# Patient Record
Sex: MALE | Race: WHITE | NOT HISPANIC OR LATINO | URBAN - METROPOLITAN AREA
[De-identification: names, ages, dates, MRNs, and addresses within clinical notes are randomized per-mention and may not be internally consistent; named-entity substitution may affect disease eponyms.]

---

## 2018-05-23 ENCOUNTER — OUTPATIENT (OUTPATIENT)
Dept: OUTPATIENT SERVICES | Facility: HOSPITAL | Age: 38
LOS: 1 days | Discharge: HOME | End: 2018-05-23

## 2018-05-23 VITALS
OXYGEN SATURATION: 96 % | DIASTOLIC BLOOD PRESSURE: 87 MMHG | RESPIRATION RATE: 16 BRPM | HEART RATE: 79 BPM | SYSTOLIC BLOOD PRESSURE: 130 MMHG

## 2018-05-23 VITALS
HEART RATE: 75 BPM | WEIGHT: 220.02 LBS | DIASTOLIC BLOOD PRESSURE: 65 MMHG | OXYGEN SATURATION: 98 % | SYSTOLIC BLOOD PRESSURE: 140 MMHG | HEIGHT: 68 IN | RESPIRATION RATE: 18 BRPM | TEMPERATURE: 98 F

## 2018-05-23 RX ORDER — ONDANSETRON 8 MG/1
4 TABLET, FILM COATED ORAL ONCE
Qty: 0 | Refills: 0 | Status: DISCONTINUED | OUTPATIENT
Start: 2018-05-23 | End: 2018-06-07

## 2018-05-23 RX ORDER — IBUPROFEN 200 MG
1 TABLET ORAL
Qty: 20 | Refills: 0
Start: 2018-05-23

## 2018-05-23 RX ORDER — HYDROMORPHONE HYDROCHLORIDE 2 MG/ML
0.5 INJECTION INTRAMUSCULAR; INTRAVENOUS; SUBCUTANEOUS
Qty: 0 | Refills: 0 | Status: DISCONTINUED | OUTPATIENT
Start: 2018-05-23 | End: 2018-05-23

## 2018-05-23 RX ORDER — SODIUM CHLORIDE 9 MG/ML
1000 INJECTION, SOLUTION INTRAVENOUS
Qty: 0 | Refills: 0 | Status: DISCONTINUED | OUTPATIENT
Start: 2018-05-23 | End: 2018-06-07

## 2018-05-23 RX ORDER — ACETAMINOPHEN 500 MG
650 TABLET ORAL ONCE
Qty: 0 | Refills: 0 | Status: DISCONTINUED | OUTPATIENT
Start: 2018-05-23 | End: 2018-06-07

## 2018-05-23 RX ORDER — OXYCODONE AND ACETAMINOPHEN 5; 325 MG/1; MG/1
1 TABLET ORAL ONCE
Qty: 0 | Refills: 0 | Status: DISCONTINUED | OUTPATIENT
Start: 2018-05-23 | End: 2018-05-23

## 2018-05-23 RX ADMIN — SODIUM CHLORIDE 100 MILLILITER(S): 9 INJECTION, SOLUTION INTRAVENOUS at 11:05

## 2018-05-23 NOTE — CHART NOTE - NSCHARTNOTEFT_GEN_A_CORE
PACU ANESTHESIA ADMISSION NOTE      Procedure: Open release of right carpal tunnel    Post op diagnosis:  Carpal tunnel syndrome of right wrist      ____  Intubated  TV:______       Rate: ______      FiO2: ______    _x___  Patent Airway    _x___  Full return of protective reflexes    _x___  Full recovery from anesthesia / back to baseline status    Vitals:            T:   98.5             BP :  131/83              R:    16          Sat:    100           P:86      Mental Status:  _x___ Awake   _____ Alert   _____ Drowsy   _____ Sedated    Nausea/Vomiting:  _x___  NO       ______Yes,   See Post - Op Orders         Pain Scale (0-10):  __0___    Treatment: _x___ None    ____ See Post - Op/PCA Orders    Post - Operative Fluids:   __x__ Oral   ____ See Post - Op Orders    Plan: Discharge:   _x___Home       _____Floor     _____Critical Care    _____  Other:_________________    Comments:  No anesthesia issues or complications noted.  Discharge when criteria met.

## 2018-05-23 NOTE — BRIEF OPERATIVE NOTE - PROCEDURE
<<-----Click on this checkbox to enter Procedure Open release of right carpal tunnel  05/23/2018    Active  TOMMIE

## 2018-05-23 NOTE — ASU DISCHARGE PLAN (ADULT/PEDIATRIC). - SPECIAL INSTRUCTIONS

## 2018-05-28 DIAGNOSIS — F17.200 NICOTINE DEPENDENCE, UNSPECIFIED, UNCOMPLICATED: ICD-10-CM

## 2018-05-28 DIAGNOSIS — G56.01 CARPAL TUNNEL SYNDROME, RIGHT UPPER LIMB: ICD-10-CM

## 2020-01-19 ENCOUNTER — EMERGENCY (EMERGENCY)
Facility: HOSPITAL | Age: 40
LOS: 0 days | Discharge: HOME | End: 2020-01-19
Attending: EMERGENCY MEDICINE | Admitting: EMERGENCY MEDICINE
Payer: COMMERCIAL

## 2020-01-19 VITALS
HEIGHT: 68 IN | HEART RATE: 96 BPM | DIASTOLIC BLOOD PRESSURE: 84 MMHG | WEIGHT: 237 LBS | RESPIRATION RATE: 18 BRPM | TEMPERATURE: 97 F | OXYGEN SATURATION: 96 % | SYSTOLIC BLOOD PRESSURE: 170 MMHG

## 2020-01-19 VITALS
SYSTOLIC BLOOD PRESSURE: 155 MMHG | OXYGEN SATURATION: 99 % | RESPIRATION RATE: 18 BRPM | HEART RATE: 90 BPM | DIASTOLIC BLOOD PRESSURE: 85 MMHG | TEMPERATURE: 98 F
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
## 2020-01-19 DIAGNOSIS — T78.40XA ALLERGY, UNSPECIFIED, INITIAL ENCOUNTER: ICD-10-CM

## 2020-01-19 PROCEDURE — 99284 EMERGENCY DEPT VISIT MOD MDM: CPT

## 2020-01-19 RX ORDER — SODIUM CHLORIDE 9 MG/ML
1000 INJECTION INTRAMUSCULAR; INTRAVENOUS; SUBCUTANEOUS ONCE
Refills: 0 | Status: COMPLETED | OUTPATIENT
Start: 2020-01-19 | End: 2020-01-19

## 2020-01-19 RX ORDER — DEXAMETHASONE 0.5 MG/5ML
10 ELIXIR ORAL ONCE
Refills: 0 | Status: COMPLETED | OUTPATIENT
Start: 2020-01-19 | End: 2020-01-19

## 2020-01-19 RX ORDER — FAMOTIDINE 10 MG/ML
20 INJECTION INTRAVENOUS ONCE
Refills: 0 | Status: COMPLETED | OUTPATIENT
Start: 2020-01-19 | End: 2020-01-19

## 2020-01-19 RX ORDER — DIPHENHYDRAMINE HCL 50 MG
50 CAPSULE ORAL ONCE
Refills: 0 | Status: COMPLETED | OUTPATIENT
Start: 2020-01-19 | End: 2020-01-19

## 2020-01-19 RX ADMIN — Medication 50 MILLIGRAM(S): at 20:22

## 2020-01-19 RX ADMIN — FAMOTIDINE 100 MILLIGRAM(S): 10 INJECTION INTRAVENOUS at 20:26

## 2020-01-19 RX ADMIN — SODIUM CHLORIDE 1000 MILLILITER(S): 9 INJECTION INTRAMUSCULAR; INTRAVENOUS; SUBCUTANEOUS at 20:20

## 2020-01-19 RX ADMIN — Medication 10 MILLIGRAM(S): at 20:23

## 2020-01-19 NOTE — ED PROVIDER NOTE - PHYSICAL EXAMINATION
CONST: Well appearing in NAD  EYES: Sclera and conjunctiva clear.   ENT: No nasal discharge. Oropharynx normal appearing, no erythema or exudates. No abscess or swelling. Uvula midline.   NECK: Non-tender, no meningeal signs. normal ROM. supple   CARD: S1 S2; No mrg  RESP: Equal BS B/L, No wheezes, rhonchi or rales. No distress  GI: Soft, non-tender, non-distended. normal BS  MS: Normal ROM in all extremities. pulses 2 +.   SKIN: Warm, dry, no acute rashes. Good turgor  NEURO: A&Ox3, No focal deficits. Strength 5/5 with no sensory deficits. Steady gait.

## 2020-01-19 NOTE — ED PROVIDER NOTE - NS ED ROS FT
Constitutional: (-) fever  Eyes/ENT: (+) lip swelling, (-) blurry vision, (-) epistaxis  Cardiovascular: (-) chest pain, (-) syncope  Respiratory: (-) cough, (-) shortness of breath  Gastrointestinal: (-) vomiting, (-) diarrhea  : (-) dysuria, (-) hematuria  Musculoskeletal: (-) neck pain, (-) back pain, (-) joint pain  Integumentary: (-) rash, (-) edema  Neurological: (-) headache, (-) altered mental status  Allergic/Immunologic: (+) pruritus

## 2020-01-19 NOTE — ED PROVIDER NOTE - PATIENT PORTAL LINK FT
You can access the FollowMyHealth Patient Portal offered by [facility] by registering at the following website: [email or web address]. By joining [employer]’s FollowMyHealth portal, you will also be able to view your health information using other applications (apps) compatible with our system.

## 2020-01-19 NOTE — ED PROVIDER NOTE - CARE PROVIDER_API CALL
Yuki Ba)  Allergy and Immunology; Internal Medicine  [address] [place] [address]  Phone: [phone]  Fax: [phone]  Follow Up Time:

## 2020-01-19 NOTE — ED PROVIDER NOTE - OBJECTIVE STATEMENT
39 M presents for eval of allergic rx. Pt states he woke from a nap x1hr ago with lip swelling and pruritis on his trunk, no aggravating or releiving factors. Denies rash, sob, difficulty swallowing, fever, numbness, weakness, n/v/d/c, wheezing

## 2020-01-19 NOTE — ED PROVIDER NOTE - ATTENDING CONTRIBUTION TO CARE
I personally evaluated the patient. I reviewed the Resident’s or Physician Assistant’s note (as assigned above), and agree with the findings and plan except as documented in my note.  Chart reviewed. Presents with swollen lips and itchiness. Exam shows alert patient, speaking in full sentences, in no distress, HEENT mildly swollen lips, no angioedema, lungs clear, no rash.

## 2020-02-09 ENCOUNTER — INPATIENT (INPATIENT)
Facility: HOSPITAL | Age: 40
LOS: 5 days | Discharge: HOME | End: 2020-02-15
Attending: INTERNAL MEDICINE | Admitting: INTERNAL MEDICINE
Payer: COMMERCIAL

## 2020-02-09 VITALS
DIASTOLIC BLOOD PRESSURE: 96 MMHG | OXYGEN SATURATION: 95 % | SYSTOLIC BLOOD PRESSURE: 146 MMHG | WEIGHT: 190.04 LBS | RESPIRATION RATE: 20 BRPM | HEART RATE: 151 BPM

## 2020-02-09 LAB
ALBUMIN SERPL ELPH-MCNC: 5 G/DL — SIGNIFICANT CHANGE UP (ref 3.5–5.2)
ALP SERPL-CCNC: 78 U/L — SIGNIFICANT CHANGE UP (ref 30–115)
ALT FLD-CCNC: 126 U/L — HIGH (ref 0–41)
ANION GAP SERPL CALC-SCNC: 23 MMOL/L — HIGH (ref 7–14)
AST SERPL-CCNC: 166 U/L — HIGH (ref 0–41)
BILIRUB SERPL-MCNC: 0.3 MG/DL — SIGNIFICANT CHANGE UP (ref 0.2–1.2)
BUN SERPL-MCNC: 34 MG/DL — HIGH (ref 10–20)
CALCIUM SERPL-MCNC: 9.2 MG/DL — SIGNIFICANT CHANGE UP (ref 8.5–10.1)
CHLORIDE SERPL-SCNC: 100 MMOL/L — SIGNIFICANT CHANGE UP (ref 98–110)
CO2 SERPL-SCNC: 17 MMOL/L — SIGNIFICANT CHANGE UP (ref 17–32)
CREAT SERPL-MCNC: 3 MG/DL — HIGH (ref 0.7–1.5)
GAS PNL BLDV: SIGNIFICANT CHANGE UP
GLUCOSE SERPL-MCNC: 234 MG/DL — HIGH (ref 70–99)
HCT VFR BLD CALC: 46.6 % — SIGNIFICANT CHANGE UP (ref 42–52)
HGB BLD-MCNC: 14.9 G/DL — SIGNIFICANT CHANGE UP (ref 14–18)
MCHC RBC-ENTMCNC: 30.9 PG — SIGNIFICANT CHANGE UP (ref 27–31)
MCHC RBC-ENTMCNC: 32 G/DL — SIGNIFICANT CHANGE UP (ref 32–37)
MCV RBC AUTO: 96.7 FL — HIGH (ref 80–94)
PLATELET # BLD AUTO: 241 K/UL — SIGNIFICANT CHANGE UP (ref 130–400)
POTASSIUM SERPL-MCNC: 5.4 MMOL/L — HIGH (ref 3.5–5)
POTASSIUM SERPL-SCNC: 5.4 MMOL/L — HIGH (ref 3.5–5)
PROT SERPL-MCNC: 7.6 G/DL — SIGNIFICANT CHANGE UP (ref 6–8)
RBC # BLD: 4.82 M/UL — SIGNIFICANT CHANGE UP (ref 4.7–6.1)
RBC # FLD: 12.8 % — SIGNIFICANT CHANGE UP (ref 11.5–14.5)
SODIUM SERPL-SCNC: 140 MMOL/L — SIGNIFICANT CHANGE UP (ref 135–146)
WBC # BLD: 25.74 K/UL — HIGH (ref 4.8–10.8)
WBC # FLD AUTO: 25.74 K/UL — HIGH (ref 4.8–10.8)

## 2020-02-09 PROCEDURE — 93010 ELECTROCARDIOGRAM REPORT: CPT

## 2020-02-09 PROCEDURE — 36556 INSERT NON-TUNNEL CV CATH: CPT

## 2020-02-09 PROCEDURE — 76937 US GUIDE VASCULAR ACCESS: CPT | Mod: 26

## 2020-02-09 PROCEDURE — 99291 CRITICAL CARE FIRST HOUR: CPT | Mod: 25

## 2020-02-09 RX ORDER — SODIUM CHLORIDE 9 MG/ML
2000 INJECTION INTRAMUSCULAR; INTRAVENOUS; SUBCUTANEOUS ONCE
Refills: 0 | Status: DISCONTINUED | OUTPATIENT
Start: 2020-02-09 | End: 2020-02-10

## 2020-02-09 NOTE — ED PROVIDER NOTE - OBJECTIVE STATEMENT
39yM with PMH opiate abuse nkda p/w suspected overdose. pt states he did some heroin today. per ems report he was found in his car after being in there for unknown length of time (suspected 4-6 hours). received 6mg narcan via ems with improvement in mental status. became tachycardic en route to hospital. pt with no complaints at this time. no nausea diaphoresis body aches or pain. no ht per his report.

## 2020-02-09 NOTE — ED PROVIDER NOTE - PHYSICAL EXAMINATION
Vital Signs: I have reviewed the initial vital signs.  Constitutional: NAD, well-nourished, appears stated age, no acute distress.  HEENT: Airway patent, moist MM, no erythema/swelling/deformity of oral structures. EOMI, PERRLA.  CV: tachycardic rate, irregular rhythm, well-perfused extremities, 2+ b/l DP and radial pulses equal.  Lungs: BCTA, no increased WOB.  ABD: NTND, no guarding or rebound, no pulsatile mass, no hernias.   MSK: Neck supple, nontender, nl ROM, no stepoff. Chest nontender. Back nontender in TLS spine or to b/l bony structures or flanks. Ext nontender, nl rom, no deformity.   INTEG: Skin warm, dry, no rash.  NEURO: A&Ox3, moving all extremities, normal speech  PSYCH: Calm, cooperative, normal affect and interaction.

## 2020-02-09 NOTE — ED PROVIDER NOTE - CLINICAL SUMMARY MEDICAL DECISION MAKING FREE TEXT BOX
I personally evaluated the patient. I reviewed the Resident’s or Physician Assistant’s note (as assigned above), and agree with the findings and plan except as documented in my note. patient evaluated for rhabdo, maynor, post narcan, multiple repeat pulmonary exams unremarkable for ards and pulmonary edema. Patient endorsed to icu, central line being laced, patient back to sinus rythms ( sinus tachycardia) while on bipap

## 2020-02-09 NOTE — ED PROVIDER NOTE - NS ED ROS FT
Eyes:  No visual changes, eye pain or discharge.  ENMT:  No hearing changes, pain, no sore throat or runny nose, no difficulty swallowing  Cardiac:  No chest pain, SOB or edema. No chest pain with exertion.  Respiratory:  No cough or respiratory distress.    GI:  No nausea, vomiting, diarrhea or abdominal pain.  :  No dysuria, frequency or burning.  MS:  No myalgia, muscle weakness, joint pain or back pain.  Neuro:  No headache or weakness.  No LOC. +AMS  Skin:  No skin rash.   Endocrine: No history of thyroid disease or diabetes.

## 2020-02-09 NOTE — ED ADULT TRIAGE NOTE - CHIEF COMPLAINT QUOTE
BIBA after being found in car by coworker  as per EMS pt overdose on unknown opiate & was in car for over 6 hours  pt gave 2 round narcan IV in the field which pt responded to  unable to obtain temp in triage

## 2020-02-09 NOTE — ED PROVIDER NOTE - ATTENDING CONTRIBUTION TO CARE
39 year old male, pmhx of heroin abuse, come sin s/p narcan administration, patient was found overdosed in his car for 4-6 hours, given narcan and came to, patient endorses heroin use for chronic back pain, no cp/sob, no loc, no fever. here found to be in afib rvr, elevated ck and rhabdo, no b line son bilateral lung US.     CONSTITUTIONAL: Well-developed; well-nourished; in no acute distress. Sitting up and providing appropriate history and physical examination  SKIN: skin exam is warm and dry, no acute rash.  HEAD: Normocephalic; atraumatic.  EYES: PERRL, 3 mm bilateral, no nystagmus, EOM intact; conjunctiva and sclera clear.  ENT: + DRY MM, No nasal discharge; airway clear.  NECK: Supple; non tender. + full passive ROM in all directions. No JVD  CARD: S1, S2 normal; no murmurs, gallops, or rubs. Regular rate and rhythm. + Symmetric Strong Pulses  RESP: No wheezes, rales or rhonchi. Good air movement bilaterally  ABD: soft; non-distended; non-tender. No Rebound, No Guarding, No signs of peritonitis, No CVA tenderness. No pulsatile abdominal mass. + Strong and Symmetric Pulses  EXT: Normal ROM. No clubbing, cyanosis or edema. Dp and Pt Pulses intact. Cap refill less than 3 seconds  NEURO: Alert, oriented, grossly unremarkable. No Focal deficits. GCS 15. NIH 0  PSYCH: Cooperative, appropriate.

## 2020-02-09 NOTE — ED PROVIDER NOTE - CARE PLAN
Principal Discharge DX:	Opiate overdose  Secondary Diagnosis:	BENNY (acute kidney injury)  Secondary Diagnosis:	Rhabdomyolysis  Secondary Diagnosis:	Respiratory acidosis

## 2020-02-10 LAB
ALBUMIN SERPL ELPH-MCNC: 4.1 G/DL — SIGNIFICANT CHANGE UP (ref 3.5–5.2)
ALBUMIN SERPL ELPH-MCNC: 4.1 G/DL — SIGNIFICANT CHANGE UP (ref 3.5–5.2)
ALP SERPL-CCNC: 52 U/L — SIGNIFICANT CHANGE UP (ref 30–115)
ALP SERPL-CCNC: 55 U/L — SIGNIFICANT CHANGE UP (ref 30–115)
ALT FLD-CCNC: 352 U/L — HIGH (ref 0–41)
ALT FLD-CCNC: 409 U/L — HIGH (ref 0–41)
ANION GAP SERPL CALC-SCNC: 13 MMOL/L — SIGNIFICANT CHANGE UP (ref 7–14)
ANION GAP SERPL CALC-SCNC: 14 MMOL/L — SIGNIFICANT CHANGE UP (ref 7–14)
ANION GAP SERPL CALC-SCNC: 15 MMOL/L — HIGH (ref 7–14)
APPEARANCE UR: ABNORMAL
AST SERPL-CCNC: 535 U/L — HIGH (ref 0–41)
AST SERPL-CCNC: 629 U/L — HIGH (ref 0–41)
BACTERIA # UR AUTO: 0 — SIGNIFICANT CHANGE UP
BASE EXCESS BLDV CALC-SCNC: -8.3 MMOL/L — LOW (ref -2–2)
BASOPHILS # BLD AUTO: 0.02 K/UL — SIGNIFICANT CHANGE UP (ref 0–0.2)
BASOPHILS # BLD AUTO: 0.07 K/UL — SIGNIFICANT CHANGE UP (ref 0–0.2)
BASOPHILS NFR BLD AUTO: 0.1 % — SIGNIFICANT CHANGE UP (ref 0–1)
BASOPHILS NFR BLD AUTO: 0.3 % — SIGNIFICANT CHANGE UP (ref 0–1)
BILIRUB DIRECT SERPL-MCNC: 0.2 MG/DL — SIGNIFICANT CHANGE UP (ref 0–0.2)
BILIRUB INDIRECT FLD-MCNC: 0.3 MG/DL — SIGNIFICANT CHANGE UP (ref 0.2–1.2)
BILIRUB SERPL-MCNC: 0.5 MG/DL — SIGNIFICANT CHANGE UP (ref 0.2–1.2)
BILIRUB SERPL-MCNC: 0.5 MG/DL — SIGNIFICANT CHANGE UP (ref 0.2–1.2)
BILIRUB UR-MCNC: NEGATIVE — SIGNIFICANT CHANGE UP
BUN SERPL-MCNC: 24 MG/DL — HIGH (ref 10–20)
BUN SERPL-MCNC: 26 MG/DL — HIGH (ref 10–20)
BUN SERPL-MCNC: 31 MG/DL — HIGH (ref 10–20)
CA-I SERPL-SCNC: 1.12 MMOL/L — SIGNIFICANT CHANGE UP (ref 1.12–1.3)
CALCIUM SERPL-MCNC: 7.7 MG/DL — LOW (ref 8.5–10.1)
CALCIUM SERPL-MCNC: 8.4 MG/DL — LOW (ref 8.5–10.1)
CALCIUM SERPL-MCNC: 8.6 MG/DL — SIGNIFICANT CHANGE UP (ref 8.5–10.1)
CHLORIDE SERPL-SCNC: 109 MMOL/L — SIGNIFICANT CHANGE UP (ref 98–110)
CHLORIDE SERPL-SCNC: 109 MMOL/L — SIGNIFICANT CHANGE UP (ref 98–110)
CHLORIDE SERPL-SCNC: 111 MMOL/L — HIGH (ref 98–110)
CK SERPL-CCNC: 8936 U/L — HIGH (ref 0–225)
CK SERPL-CCNC: HIGH U/L (ref 0–225)
CO2 SERPL-SCNC: 17 MMOL/L — SIGNIFICANT CHANGE UP (ref 17–32)
CO2 SERPL-SCNC: 17 MMOL/L — SIGNIFICANT CHANGE UP (ref 17–32)
CO2 SERPL-SCNC: 20 MMOL/L — SIGNIFICANT CHANGE UP (ref 17–32)
COLOR SPEC: YELLOW — SIGNIFICANT CHANGE UP
CREAT SERPL-MCNC: 1.4 MG/DL — SIGNIFICANT CHANGE UP (ref 0.7–1.5)
CREAT SERPL-MCNC: 1.4 MG/DL — SIGNIFICANT CHANGE UP (ref 0.7–1.5)
CREAT SERPL-MCNC: 2.1 MG/DL — HIGH (ref 0.7–1.5)
DIFF PNL FLD: ABNORMAL
EOSINOPHIL # BLD AUTO: 0 K/UL — SIGNIFICANT CHANGE UP (ref 0–0.7)
EOSINOPHIL # BLD AUTO: 0 K/UL — SIGNIFICANT CHANGE UP (ref 0–0.7)
EOSINOPHIL NFR BLD AUTO: 0 % — SIGNIFICANT CHANGE UP (ref 0–8)
EOSINOPHIL NFR BLD AUTO: 0 % — SIGNIFICANT CHANGE UP (ref 0–8)
EPI CELLS # UR: 7 /HPF — HIGH (ref 0–5)
GAS PNL BLDA: SIGNIFICANT CHANGE UP
GAS PNL BLDV: 144 MMOL/L — SIGNIFICANT CHANGE UP (ref 136–145)
GLUCOSE SERPL-MCNC: 110 MG/DL — HIGH (ref 70–99)
GLUCOSE SERPL-MCNC: 77 MG/DL — SIGNIFICANT CHANGE UP (ref 70–99)
GLUCOSE SERPL-MCNC: 86 MG/DL — SIGNIFICANT CHANGE UP (ref 70–99)
GLUCOSE UR QL: ABNORMAL
HCO3 BLDV-SCNC: 22 MMOL/L — SIGNIFICANT CHANGE UP (ref 22–29)
HCT VFR BLD CALC: 38.4 % — LOW (ref 42–52)
HCT VFR BLDA CALC: 44 % — SIGNIFICANT CHANGE UP (ref 34–44)
HGB BLD CALC-MCNC: 14.4 G/DL — SIGNIFICANT CHANGE UP (ref 14–18)
HGB BLD-MCNC: 12.6 G/DL — LOW (ref 14–18)
HYALINE CASTS # UR AUTO: >145 /LPF — HIGH (ref 0–7)
IMM GRANULOCYTES NFR BLD AUTO: 0.5 % — HIGH (ref 0.1–0.3)
IMM GRANULOCYTES NFR BLD AUTO: 1 % — HIGH (ref 0.1–0.3)
KETONES UR-MCNC: NEGATIVE — SIGNIFICANT CHANGE UP
LACTATE BLDV-MCNC: 5.6 MMOL/L — HIGH (ref 0.5–1.6)
LEUKOCYTE ESTERASE UR-ACNC: NEGATIVE — SIGNIFICANT CHANGE UP
LYMPHOCYTES # BLD AUTO: 1.22 K/UL — SIGNIFICANT CHANGE UP (ref 1.2–3.4)
LYMPHOCYTES # BLD AUTO: 1.56 K/UL — SIGNIFICANT CHANGE UP (ref 1.2–3.4)
LYMPHOCYTES # BLD AUTO: 4.7 % — LOW (ref 20.5–51.1)
LYMPHOCYTES # BLD AUTO: 6.7 % — LOW (ref 20.5–51.1)
MAGNESIUM SERPL-MCNC: 2.1 MG/DL — SIGNIFICANT CHANGE UP (ref 1.8–2.4)
MAGNESIUM SERPL-MCNC: 2.1 MG/DL — SIGNIFICANT CHANGE UP (ref 1.8–2.4)
MCHC RBC-ENTMCNC: 30.9 PG — SIGNIFICANT CHANGE UP (ref 27–31)
MCHC RBC-ENTMCNC: 32.8 G/DL — SIGNIFICANT CHANGE UP (ref 32–37)
MCV RBC AUTO: 94.1 FL — HIGH (ref 80–94)
MONOCYTES # BLD AUTO: 0.74 K/UL — HIGH (ref 0.1–0.6)
MONOCYTES # BLD AUTO: 1.35 K/UL — HIGH (ref 0.1–0.6)
MONOCYTES NFR BLD AUTO: 2.9 % — SIGNIFICANT CHANGE UP (ref 1.7–9.3)
MONOCYTES NFR BLD AUTO: 5.8 % — SIGNIFICANT CHANGE UP (ref 1.7–9.3)
NEUTROPHILS # BLD AUTO: 20.27 K/UL — HIGH (ref 1.4–6.5)
NEUTROPHILS # BLD AUTO: 23.46 K/UL — HIGH (ref 1.4–6.5)
NEUTROPHILS NFR BLD AUTO: 86.9 % — HIGH (ref 42.2–75.2)
NEUTROPHILS NFR BLD AUTO: 91.1 % — HIGH (ref 42.2–75.2)
NITRITE UR-MCNC: NEGATIVE — SIGNIFICANT CHANGE UP
NRBC # BLD: 0 /100 WBCS — SIGNIFICANT CHANGE UP (ref 0–0)
NRBC # BLD: 0 /100 WBCS — SIGNIFICANT CHANGE UP (ref 0–0)
PCO2 BLDV: 62 MMHG — HIGH (ref 41–51)
PH BLDV: 7.15 — LOW (ref 7.26–7.43)
PH UR: 6 — SIGNIFICANT CHANGE UP (ref 5–8)
PLATELET # BLD AUTO: 170 K/UL — SIGNIFICANT CHANGE UP (ref 130–400)
PO2 BLDV: 17 MMHG — LOW (ref 20–40)
POTASSIUM BLDV-SCNC: 4.8 MMOL/L — SIGNIFICANT CHANGE UP (ref 3.3–5.6)
POTASSIUM SERPL-MCNC: 4.5 MMOL/L — SIGNIFICANT CHANGE UP (ref 3.5–5)
POTASSIUM SERPL-MCNC: 4.6 MMOL/L — SIGNIFICANT CHANGE UP (ref 3.5–5)
POTASSIUM SERPL-MCNC: 5.2 MMOL/L — HIGH (ref 3.5–5)
POTASSIUM SERPL-SCNC: 4.5 MMOL/L — SIGNIFICANT CHANGE UP (ref 3.5–5)
POTASSIUM SERPL-SCNC: 4.6 MMOL/L — SIGNIFICANT CHANGE UP (ref 3.5–5)
POTASSIUM SERPL-SCNC: 5.2 MMOL/L — HIGH (ref 3.5–5)
PROT SERPL-MCNC: 6.1 G/DL — SIGNIFICANT CHANGE UP (ref 6–8)
PROT SERPL-MCNC: 6.5 G/DL — SIGNIFICANT CHANGE UP (ref 6–8)
PROT UR-MCNC: ABNORMAL
RBC # BLD: 4.08 M/UL — LOW (ref 4.7–6.1)
RBC # FLD: 12.9 % — SIGNIFICANT CHANGE UP (ref 11.5–14.5)
RBC CASTS # UR COMP ASSIST: 32 /HPF — HIGH (ref 0–4)
SAO2 % BLDV: 20 % — SIGNIFICANT CHANGE UP
SODIUM SERPL-SCNC: 141 MMOL/L — SIGNIFICANT CHANGE UP (ref 135–146)
SODIUM SERPL-SCNC: 142 MMOL/L — SIGNIFICANT CHANGE UP (ref 135–146)
SODIUM SERPL-SCNC: 142 MMOL/L — SIGNIFICANT CHANGE UP (ref 135–146)
SP GR SPEC: 1.02 — SIGNIFICANT CHANGE UP (ref 1.01–1.02)
T4 AB SER-ACNC: 6.6 UG/DL — SIGNIFICANT CHANGE UP (ref 4.6–12)
TROPONIN T SERPL-MCNC: <0.01 NG/ML — SIGNIFICANT CHANGE UP
TROPONIN T SERPL-MCNC: <0.01 NG/ML — SIGNIFICANT CHANGE UP
TSH SERPL-MCNC: 0.98 UIU/ML — SIGNIFICANT CHANGE UP (ref 0.27–4.2)
UROBILINOGEN FLD QL: SIGNIFICANT CHANGE UP
WBC # BLD: 23.31 K/UL — HIGH (ref 4.8–10.8)
WBC # FLD AUTO: 23.31 K/UL — HIGH (ref 4.8–10.8)
WBC UR QL: 4 /HPF — SIGNIFICANT CHANGE UP (ref 0–5)

## 2020-02-10 PROCEDURE — 71045 X-RAY EXAM CHEST 1 VIEW: CPT | Mod: 26,77

## 2020-02-10 PROCEDURE — 71045 X-RAY EXAM CHEST 1 VIEW: CPT | Mod: 26

## 2020-02-10 PROCEDURE — 93306 TTE W/DOPPLER COMPLETE: CPT | Mod: 26

## 2020-02-10 PROCEDURE — 76770 US EXAM ABDO BACK WALL COMP: CPT | Mod: 26

## 2020-02-10 PROCEDURE — 93010 ELECTROCARDIOGRAM REPORT: CPT

## 2020-02-10 RX ORDER — SODIUM CHLORIDE 9 MG/ML
2000 INJECTION INTRAMUSCULAR; INTRAVENOUS; SUBCUTANEOUS ONCE
Refills: 0 | Status: COMPLETED | OUTPATIENT
Start: 2020-02-10 | End: 2020-02-10

## 2020-02-10 RX ORDER — SODIUM CHLORIDE 9 MG/ML
1000 INJECTION INTRAMUSCULAR; INTRAVENOUS; SUBCUTANEOUS
Refills: 0 | Status: DISCONTINUED | OUTPATIENT
Start: 2020-02-10 | End: 2020-02-10

## 2020-02-10 RX ORDER — AMPICILLIN SODIUM AND SULBACTAM SODIUM 250; 125 MG/ML; MG/ML
1.5 INJECTION, POWDER, FOR SUSPENSION INTRAMUSCULAR; INTRAVENOUS ONCE
Refills: 0 | Status: COMPLETED | OUTPATIENT
Start: 2020-02-10 | End: 2020-02-10

## 2020-02-10 RX ORDER — AMPICILLIN SODIUM AND SULBACTAM SODIUM 250; 125 MG/ML; MG/ML
1.5 INJECTION, POWDER, FOR SUSPENSION INTRAMUSCULAR; INTRAVENOUS EVERY 6 HOURS
Refills: 0 | Status: DISCONTINUED | OUTPATIENT
Start: 2020-02-10 | End: 2020-02-15

## 2020-02-10 RX ORDER — CHLORHEXIDINE GLUCONATE 213 G/1000ML
1 SOLUTION TOPICAL
Refills: 0 | Status: DISCONTINUED | OUTPATIENT
Start: 2020-02-10 | End: 2020-02-15

## 2020-02-10 RX ORDER — HEPARIN SODIUM 5000 [USP'U]/ML
5000 INJECTION INTRAVENOUS; SUBCUTANEOUS EVERY 8 HOURS
Refills: 0 | Status: DISCONTINUED | OUTPATIENT
Start: 2020-02-10 | End: 2020-02-12

## 2020-02-10 RX ORDER — SODIUM CHLORIDE 9 MG/ML
1000 INJECTION INTRAMUSCULAR; INTRAVENOUS; SUBCUTANEOUS
Refills: 0 | Status: DISCONTINUED | OUTPATIENT
Start: 2020-02-10 | End: 2020-02-11

## 2020-02-10 RX ORDER — OXYCODONE HYDROCHLORIDE 5 MG/1
0 TABLET ORAL
Qty: 0 | Refills: 0 | DISCHARGE

## 2020-02-10 RX ORDER — FAMOTIDINE 10 MG/ML
20 INJECTION INTRAVENOUS ONCE
Refills: 0 | Status: COMPLETED | OUTPATIENT
Start: 2020-02-10 | End: 2020-02-10

## 2020-02-10 RX ADMIN — AMPICILLIN SODIUM AND SULBACTAM SODIUM 100 GRAM(S): 250; 125 INJECTION, POWDER, FOR SUSPENSION INTRAMUSCULAR; INTRAVENOUS at 18:26

## 2020-02-10 RX ADMIN — SODIUM CHLORIDE 4000 MILLILITER(S): 9 INJECTION INTRAMUSCULAR; INTRAVENOUS; SUBCUTANEOUS at 01:12

## 2020-02-10 RX ADMIN — HEPARIN SODIUM 5000 UNIT(S): 5000 INJECTION INTRAVENOUS; SUBCUTANEOUS at 13:15

## 2020-02-10 RX ADMIN — SODIUM CHLORIDE 75 MILLILITER(S): 9 INJECTION INTRAMUSCULAR; INTRAVENOUS; SUBCUTANEOUS at 13:15

## 2020-02-10 RX ADMIN — FAMOTIDINE 20 MILLIGRAM(S): 10 INJECTION INTRAVENOUS at 01:18

## 2020-02-10 RX ADMIN — CHLORHEXIDINE GLUCONATE 1 APPLICATION(S): 213 SOLUTION TOPICAL at 07:17

## 2020-02-10 RX ADMIN — AMPICILLIN SODIUM AND SULBACTAM SODIUM 200 GRAM(S): 250; 125 INJECTION, POWDER, FOR SUSPENSION INTRAMUSCULAR; INTRAVENOUS at 01:32

## 2020-02-10 RX ADMIN — HEPARIN SODIUM 5000 UNIT(S): 5000 INJECTION INTRAVENOUS; SUBCUTANEOUS at 07:18

## 2020-02-10 RX ADMIN — SODIUM CHLORIDE 100 MILLILITER(S): 9 INJECTION INTRAMUSCULAR; INTRAVENOUS; SUBCUTANEOUS at 12:33

## 2020-02-10 RX ADMIN — SODIUM CHLORIDE 100 MILLILITER(S): 9 INJECTION INTRAMUSCULAR; INTRAVENOUS; SUBCUTANEOUS at 16:56

## 2020-02-10 RX ADMIN — HEPARIN SODIUM 5000 UNIT(S): 5000 INJECTION INTRAVENOUS; SUBCUTANEOUS at 21:55

## 2020-02-10 NOTE — H&P ADULT - HISTORY OF PRESENT ILLNESS
39 year old man with PMHx opiod abuse presenting after being found in his car unresponsive. He snorts heroin daily. He was given narcan 6mg by EMS upon arrival. He then became tachycardia to the 150s and was found to be in Afib with RVR. Denied fever, chills, n/v/d/c, cp,  pleuritic cp, palpitations, cough, wheezing, hemoptysis, HA, blurry vision, dizziness, lightheadedness, numbness, tingling, neck pain, neck stiffness, abdominal pain, melena, BRBPR, hematuria, urinary incontinace, trauma, calf pain/swelling/erythema, sick contacts, recent travel or rash.    In ED, he was found to be in BENNY with a Cr of 3. His CK was in the thousands consistent with rhabdomyolysis. He was also found to have respiratory acidosis on ABG with pH 7,1 and pCO2 62. He was given 1L NS and placed on BiPaP with improvement. CXR showed b/l infiltrates. WBC was 25. 39 year old man with PMHx opiod abuse ( was in patient detox, last 4 weeks op detox), presenting after being found in his car unresponsive was in car for 4 to 6 h. He snorts heroin daily. He was given narcan 6mg by EMS upon arrival. He then became tachycardia to the 150s and was found to be in Afib with RVR. Denied fever, chills, n/v/d/c, cp,  pleuritic cp, palpitations, cough, wheezing, hemoptysis, HA, blurry vision, dizziness, lightheadedness, numbness, tingling, neck pain, neck stiffness, abdominal pain, melena, BRBPR, hematuria, urinary incontinace, trauma, calf pain/swelling/erythema, sick contacts, recent travel or rash.    In ED, he was found to be in BENNY with a Cr of 3. High CK. He was also found to have respiratory acidosis on ABG with pH 7,1 and pCO2 62. He was given 1L NS and placed on BiPaP with improvement. CXR showed b/l infiltrates. WBC was 25. called for MICU. This AM more awake c/o mainly of back pain.

## 2020-02-10 NOTE — PATIENT PROFILE ADULT - FUNCTIONAL SCREEN CURRENT LEVEL: COMMUNICATION, MLM
Orders for admission, patient is aware of plan and ready to go upstairs. Any questions, please call ED RN costa at extension [phone]. Pt is alert and oriented. Family at bedside. Pt is ambulatory with standby assist.  Pt has history of multiple falls. 0 = understands/communicates without difficulty

## 2020-02-10 NOTE — H&P ADULT - ATTENDING COMMENTS
patient seen and examined, agree with above, acute hypercapnic resp failure/ opiates overdose/ BIPAP/ New A FIB, Rhabdo, renal failure, aspiration pneumonia/ IVF/ ABX/ Renal eval. spoke with wife/ sister at bedside

## 2020-02-10 NOTE — H&P ADULT - ASSESSMENT
IMPRESSION:  Opiod overdose s/p narcan 6mg  BENNY secondary to rhabdomyolysis  Respiratory acidosis  New AFib with RVR  b/l PNA vs ARDS    PLAN:  CNS: no depressants.     HEENT: Oral care    PULMONARY:  HOB @ 45 degrees    CARDIOVASCULAR: NS @ 50cc/hr if Cl low on repeat BMP. LR if normal or high Cl. FU 2d echo, trend cardiac enzymes    GI: GI prophylaxis.  NPO for now    RENAL:  Follow up lytes.  Correct as needed. F/u CK    INFECTIOUS DISEASE: Follow up cultures: blood and urine. Unasyn for now    HEMATOLOGICAL:  DVT prophylaxis.    ENDOCRINE:  Follow up FS.    MUSCULOSKELETAL: increase ambulation as tolerated    Dispo  -from home  -ambulates independently  -anticipated discharge when medically stable    DVT ppx  GI ppx  CHG 4% daily and PRN  OOBTC  NPO  FULL CODE IMPRESSION:  Opiod overdose s/p narcan 6mg  BENNY secondary to rhabdomyolysis  Respiratory acidosis  New AFib with RVR  b/l PNA vs ARDS    PLAN:  CNS: no depressants.     HEENT: Oral care    PULMONARY:  HOB @ 45 degrees. f/u AM CXR. BiPAP for now. Low threshold to intubate. f/u ABG in AM    CARDIOVASCULAR: NS @ 50cc/hr if Cl low on repeat BMP. LR if normal or high Cl. FU 2d echo, trend cardiac enzymes    GI: GI prophylaxis.  NPO for now    RENAL:  Follow up lytes.  Correct as needed. F/u CK. F/u Renal US.    INFECTIOUS DISEASE: Follow up cultures: blood and urine. Unasyn for now    HEMATOLOGICAL:  DVT prophylaxis.    ENDOCRINE:  Follow up FS.    MUSCULOSKELETAL: increase ambulation as tolerated    Dispo  -from home  -ambulates independently  -anticipated discharge when medically stable    DVT ppx  GI ppx  CHG 4% daily and PRN  OOBTC  NPO  FULL CODE IMPRESSION:    Acute hypercapnic respiratory failure/ Opioid overdose s/p narcan   BENNY secondary to rhabdomyolysis  New AFib with RVR  b/l infiltrates/ aspiration/ ? NCPE    PLAN:    CNS: no depressants.     HEENT: Oral care    PULMONARY:  HOB @ 45 degrees. f/u CXR. BiPAP for now. repeat ABG    CARDIOVASCULAR: IVF ++, CE, trend CK, echo, cardio eval, ? need for AC    GI: GI prophylaxis.  NPO for now    RENAL:  Follow up lytes.  Correct as needed. F/u CK. F/u Renal US. trend CMP    INFECTIOUS DISEASE: Follow up cultures: blood and urine. Unasyn for now    HEMATOLOGICAL:  DVT prophylaxis.    ENDOCRINE:  Follow up FS.    MUSCULOSKELETAL: increase ambulation as tolerated    Dispo  -from home  -ambulates independently  -anticipated discharge when medically stable    Detox/ pain eval    DVT ppx  GI ppx  CHG 4% daily and PRN  OOBTC  NPO  FULL CODE

## 2020-02-10 NOTE — H&P ADULT - NSHPPHYSICALEXAM_GEN_ALL_CORE
GENERAL: NAD  HEAD:  NCAT  EYES: EOMI, PERRL, conjunctiva clear  ENMT: No tonsillar erythema, exudates, or enlargement; Moist mucous membrane  NECK: Supple  NERVOUS SYSTEM: AAOX2  CHEST/LUNG: course b/l BS  HEART: tachycardic  ABDOMEN: soft, NT/ND (+) bs, no HSM  EXTREMITIES:  2+ Peripheral Pulses, No c/c/e  LYMPH: No lymphadenopathy noted  SKIN: No rashes or lesions ICU Vital Signs Last 24 Hrs    HR: 97 (10 Feb 2020 03:30) (87 - 153)  BP: 105/67 (10 Feb 2020 03:30) (84/54 - 146/96)  RR: 18 (10 Feb 2020 03:30) (18 - 20)  SpO2: 98% (10 Feb 2020 03:30) (95% - 100%)    GENERAL: on BIPAP  HEAD:  NCAT  EYES: EOMI, PERRL, conjunctiva clear  ENMT: No tonsillar erythema, exudates, or enlargement; Moist mucous membrane  NECK: Supple  NERVOUS SYSTEM: AAOX2  CHEST/LUNG: course b/l BS  HEART: tachycardic  ABDOMEN: soft, NT/ND (+) bs, no HSM  EXTREMITIES:  2+ Peripheral Pulses, No c/c/e  LYMPH: No lymphadenopathy noted  SKIN: No rashes or lesions

## 2020-02-10 NOTE — ED PROCEDURE NOTE - CPROC ED INFUS LINE DETAIL1
Ultrasound guidance was used during placement./The catheter was placed using sterile technique./All lumen(s) aspirated and flushed without difficulty./The location was identified, and the area was draped and prepped./The guidewire was recovered.

## 2020-02-10 NOTE — SBIRT NOTE ADULT - NSSBIRTDRGBRIEFINTDET_GEN_A_CORE
Screening results were reviewed with the patient and patient was provided information about healthy guidelines and potential negative consequences associated with level of risk. Motivation and readiness to reduce or stop use was discussed and goals and activities to make changes were suggested/offered. Options discussed for further evaluation and treatment, but referral to treatment was not completed because Patient is currently in treatment, [facility].    Consultation with [facility] Peer Advocate offered, patient refused.    Naloxone Rescue Kit dispensed: Pt was educated about Naloxone and trained on how to assemble and utilize the kit.

## 2020-02-10 NOTE — H&P ADULT - NSHPLABSRESULTS_GEN_ALL_CORE
Labs:                        14.9   25.74 )-----------( 241      ( 09 Feb 2020 23:12 )             46.6                                   02-09    140  |  100  |  34<H>  ----------------------------<  234<H>  5.4<H>   |  17  |  3.0<H>    Ca    9.2      09 Feb 2020 23:12    TPro  7.6  /  Alb  5.0  /  TBili  0.3  /  DBili  x   /  AST  166<H>  /  ALT  126<H>  /  AlkPhos  78  02-09    LIVER FUNCTIONS - ( 09 Feb 2020 23:12 )  Alb: 5.0 g/dL / Pro: 7.6 g/dL / ALK PHOS: 78 U/L / ALT: 126 U/L / AST: 166 U/L / GGT: x                                        CARDIAC MARKERS ( 09 Feb 2020 23:28 )  x     / <0.01 ng/mL / x     / x     / x      CARDIAC MARKERS ( 09 Feb 2020 23:14 )  x     / x     / 8936 U/L / x     / x        Troponin T, Serum: <0.01 ng/mL (02-09-20 @ 23:28)    Imaging:  CXR: b/l infiltrates    Blood Gas Venous - Hemoglobin/Hematocrit (02.09.20 @ 23:28)    Total Hemoglobin, Calculated: 14.4 g/dL    Hematocrit, Calculated: 44.0 %  Blood Gas Profile - Venous (02.09.20 @ 23:28)    pH, Venous: 7.15: VBG results notified to Dr. Alvarez @ 0023 via spectra    pCO2, Venous: 62 mmHg    pO2, Venous: 17 mmHg    HCO3, Venous: 22 mmoL/L    Base Excess, Venous: -8.3 mmoL/L    Oxygen Saturation, Venous: 20 %    ECG: Afib with RVR

## 2020-02-10 NOTE — H&P ADULT - NSHPSOCIALHISTORY_GEN_ALL_CORE
(+) smoker  (+) opiods(snorts heroin)  (+) occassional alcohol (+) smoker  (+) opioids(snorts heroin)  (+) occasional alcohol

## 2020-02-11 LAB
ALBUMIN SERPL ELPH-MCNC: 3.5 G/DL — SIGNIFICANT CHANGE UP (ref 3.5–5.2)
ALP SERPL-CCNC: 47 U/L — SIGNIFICANT CHANGE UP (ref 30–115)
ALT FLD-CCNC: 414 U/L — HIGH (ref 0–41)
ANION GAP SERPL CALC-SCNC: 11 MMOL/L — SIGNIFICANT CHANGE UP (ref 7–14)
ANION GAP SERPL CALC-SCNC: 14 MMOL/L — SIGNIFICANT CHANGE UP (ref 7–14)
AST SERPL-CCNC: 605 U/L — HIGH (ref 0–41)
BASOPHILS # BLD AUTO: 0.03 K/UL — SIGNIFICANT CHANGE UP (ref 0–0.2)
BASOPHILS NFR BLD AUTO: 0.2 % — SIGNIFICANT CHANGE UP (ref 0–1)
BILIRUB SERPL-MCNC: 0.6 MG/DL — SIGNIFICANT CHANGE UP (ref 0.2–1.2)
BUN SERPL-MCNC: 15 MG/DL — SIGNIFICANT CHANGE UP (ref 10–20)
BUN SERPL-MCNC: 16 MG/DL — SIGNIFICANT CHANGE UP (ref 10–20)
CALCIUM SERPL-MCNC: 8.2 MG/DL — LOW (ref 8.5–10.1)
CALCIUM SERPL-MCNC: 8.5 MG/DL — SIGNIFICANT CHANGE UP (ref 8.5–10.1)
CHLORIDE SERPL-SCNC: 107 MMOL/L — SIGNIFICANT CHANGE UP (ref 98–110)
CHLORIDE SERPL-SCNC: 109 MMOL/L — SIGNIFICANT CHANGE UP (ref 98–110)
CK SERPL-CCNC: HIGH U/L (ref 0–225)
CO2 SERPL-SCNC: 20 MMOL/L — SIGNIFICANT CHANGE UP (ref 17–32)
CO2 SERPL-SCNC: 23 MMOL/L — SIGNIFICANT CHANGE UP (ref 17–32)
CREAT SERPL-MCNC: 0.8 MG/DL — SIGNIFICANT CHANGE UP (ref 0.7–1.5)
CREAT SERPL-MCNC: 1 MG/DL — SIGNIFICANT CHANGE UP (ref 0.7–1.5)
CULTURE RESULTS: NO GROWTH — SIGNIFICANT CHANGE UP
EOSINOPHIL # BLD AUTO: 0 K/UL — SIGNIFICANT CHANGE UP (ref 0–0.7)
EOSINOPHIL NFR BLD AUTO: 0 % — SIGNIFICANT CHANGE UP (ref 0–8)
GLUCOSE SERPL-MCNC: 109 MG/DL — HIGH (ref 70–99)
GLUCOSE SERPL-MCNC: 114 MG/DL — HIGH (ref 70–99)
HCT VFR BLD CALC: 34.4 % — LOW (ref 42–52)
HGB BLD-MCNC: 11.5 G/DL — LOW (ref 14–18)
IMM GRANULOCYTES NFR BLD AUTO: 0.4 % — HIGH (ref 0.1–0.3)
LYMPHOCYTES # BLD AUTO: 1.56 K/UL — SIGNIFICANT CHANGE UP (ref 1.2–3.4)
LYMPHOCYTES # BLD AUTO: 11.5 % — LOW (ref 20.5–51.1)
MAGNESIUM SERPL-MCNC: 2.3 MG/DL — SIGNIFICANT CHANGE UP (ref 1.8–2.4)
MCHC RBC-ENTMCNC: 30.7 PG — SIGNIFICANT CHANGE UP (ref 27–31)
MCHC RBC-ENTMCNC: 33.4 G/DL — SIGNIFICANT CHANGE UP (ref 32–37)
MCV RBC AUTO: 91.7 FL — SIGNIFICANT CHANGE UP (ref 80–94)
MONOCYTES # BLD AUTO: 1.2 K/UL — HIGH (ref 0.1–0.6)
MONOCYTES NFR BLD AUTO: 8.9 % — SIGNIFICANT CHANGE UP (ref 1.7–9.3)
NEUTROPHILS # BLD AUTO: 10.69 K/UL — HIGH (ref 1.4–6.5)
NEUTROPHILS NFR BLD AUTO: 79 % — HIGH (ref 42.2–75.2)
NRBC # BLD: 0 /100 WBCS — SIGNIFICANT CHANGE UP (ref 0–0)
PLATELET # BLD AUTO: 161 K/UL — SIGNIFICANT CHANGE UP (ref 130–400)
POTASSIUM SERPL-MCNC: 4 MMOL/L — SIGNIFICANT CHANGE UP (ref 3.5–5)
POTASSIUM SERPL-MCNC: 4.1 MMOL/L — SIGNIFICANT CHANGE UP (ref 3.5–5)
POTASSIUM SERPL-SCNC: 4 MMOL/L — SIGNIFICANT CHANGE UP (ref 3.5–5)
POTASSIUM SERPL-SCNC: 4.1 MMOL/L — SIGNIFICANT CHANGE UP (ref 3.5–5)
PROT SERPL-MCNC: 5.8 G/DL — LOW (ref 6–8)
RBC # BLD: 3.75 M/UL — LOW (ref 4.7–6.1)
RBC # FLD: 13.2 % — SIGNIFICANT CHANGE UP (ref 11.5–14.5)
SODIUM SERPL-SCNC: 141 MMOL/L — SIGNIFICANT CHANGE UP (ref 135–146)
SODIUM SERPL-SCNC: 143 MMOL/L — SIGNIFICANT CHANGE UP (ref 135–146)
SPECIMEN SOURCE: SIGNIFICANT CHANGE UP
WBC # BLD: 13.54 K/UL — HIGH (ref 4.8–10.8)
WBC # FLD AUTO: 13.54 K/UL — HIGH (ref 4.8–10.8)

## 2020-02-11 PROCEDURE — 71045 X-RAY EXAM CHEST 1 VIEW: CPT | Mod: 26

## 2020-02-11 PROCEDURE — 99253 IP/OBS CNSLTJ NEW/EST LOW 45: CPT

## 2020-02-11 RX ORDER — GABAPENTIN 400 MG/1
300 CAPSULE ORAL THREE TIMES A DAY
Refills: 0 | Status: DISCONTINUED | OUTPATIENT
Start: 2020-02-11 | End: 2020-02-15

## 2020-02-11 RX ORDER — TRAMADOL HYDROCHLORIDE 50 MG/1
50 TABLET ORAL ONCE
Refills: 0 | Status: DISCONTINUED | OUTPATIENT
Start: 2020-02-11 | End: 2020-02-11

## 2020-02-11 RX ORDER — SODIUM CHLORIDE 9 MG/ML
1000 INJECTION INTRAMUSCULAR; INTRAVENOUS; SUBCUTANEOUS
Refills: 0 | Status: DISCONTINUED | OUTPATIENT
Start: 2020-02-11 | End: 2020-02-13

## 2020-02-11 RX ORDER — METHOCARBAMOL 500 MG/1
500 TABLET, FILM COATED ORAL THREE TIMES A DAY
Refills: 0 | Status: DISCONTINUED | OUTPATIENT
Start: 2020-02-11 | End: 2020-02-15

## 2020-02-11 RX ADMIN — HEPARIN SODIUM 5000 UNIT(S): 5000 INJECTION INTRAVENOUS; SUBCUTANEOUS at 21:08

## 2020-02-11 RX ADMIN — AMPICILLIN SODIUM AND SULBACTAM SODIUM 100 GRAM(S): 250; 125 INJECTION, POWDER, FOR SUSPENSION INTRAMUSCULAR; INTRAVENOUS at 12:11

## 2020-02-11 RX ADMIN — AMPICILLIN SODIUM AND SULBACTAM SODIUM 100 GRAM(S): 250; 125 INJECTION, POWDER, FOR SUSPENSION INTRAMUSCULAR; INTRAVENOUS at 00:50

## 2020-02-11 RX ADMIN — Medication 50 MILLIGRAM(S): at 16:28

## 2020-02-11 RX ADMIN — HEPARIN SODIUM 5000 UNIT(S): 5000 INJECTION INTRAVENOUS; SUBCUTANEOUS at 16:29

## 2020-02-11 RX ADMIN — SODIUM CHLORIDE 150 MILLILITER(S): 9 INJECTION INTRAMUSCULAR; INTRAVENOUS; SUBCUTANEOUS at 05:21

## 2020-02-11 RX ADMIN — GABAPENTIN 300 MILLIGRAM(S): 400 CAPSULE ORAL at 21:08

## 2020-02-11 RX ADMIN — TRAMADOL HYDROCHLORIDE 50 MILLIGRAM(S): 50 TABLET ORAL at 13:40

## 2020-02-11 RX ADMIN — AMPICILLIN SODIUM AND SULBACTAM SODIUM 100 GRAM(S): 250; 125 INJECTION, POWDER, FOR SUSPENSION INTRAMUSCULAR; INTRAVENOUS at 05:20

## 2020-02-11 RX ADMIN — AMPICILLIN SODIUM AND SULBACTAM SODIUM 100 GRAM(S): 250; 125 INJECTION, POWDER, FOR SUSPENSION INTRAMUSCULAR; INTRAVENOUS at 19:03

## 2020-02-11 RX ADMIN — METHOCARBAMOL 500 MILLIGRAM(S): 500 TABLET, FILM COATED ORAL at 21:08

## 2020-02-11 RX ADMIN — TRAMADOL HYDROCHLORIDE 50 MILLIGRAM(S): 50 TABLET ORAL at 13:09

## 2020-02-11 RX ADMIN — HEPARIN SODIUM 5000 UNIT(S): 5000 INJECTION INTRAVENOUS; SUBCUTANEOUS at 05:21

## 2020-02-11 NOTE — CONSULT NOTE ADULT - ASSESSMENT
Assessment:     Patient is a 39 y o male w/ no past medical history, w/ past substance use history of heroin use disorder, in remission prior to day of presentation, who was BIBA after being found in his car unresponsive for 4-6h.  S/p Naracn dosing, with w/ return of consciousness.  In ED, patient was found to have Afib w/ RVR.  BENNY with a Cr of 3 and w/ elevated CK to 16,855.  He was also found to have respiratory acidosis and was admitted to MICU for acute hypercapnic respiratory failure, with complaints of 4d history of shooting left lower back pain, radiating to LLE upto the knee with associated paresthesia.        On physical exam, patient appears to be in distress and pain, with accompanying decreased b/l patellar reflexes, decreased sensation over lumbar region, and decreased strength and motion testing of LLE.  Patient will likely benefit from below regimen to address pain and swelling and imaging to assess for lumbar disc involvement.     Plan:     -Prednisone 50mg PO w/ 10mg PO qdaily taper   -Gabapentin 300mg PO TID   -Robaxin 750mg PO TID   -L Spine MRI w/ and w/o contrast     attending note to follow Assessment:     Patient is a 39 y o male w/ no past medical history, w/ past substance use history of heroin use disorder, in remission prior to day of presentation, who was BIBA after being found in his car unresponsive for 4-6h.  S/p Naracn dosing, with w/ return of consciousness.  In ED, patient was found to have Afib w/ RVR.  BENNY with a Cr of 3 and w/ elevated CK to 16,855.  He was also found to have respiratory acidosis and was admitted to MICU for acute hypercapnic respiratory failure, with complaints of 4d history of shooting left lower back pain, radiating to LLE upto the knee with associated paresthesia.        On physical exam, patient appears to be in distress and pain, with accompanying decreased b/l patellar reflexes, decreased sensation over lumbar region, and decreased strength and motion testing of LLE.  Patient will likely benefit from below regimen to address pain and swelling and imaging to assess for lumbar disc involvement.     Plan:   - pain management evaluation  -Prednisone 50mg PO w/ 10mg PO qdaily taper   -Gabapentin 300mg PO TID   -Robaxin 750mg PO TID   -L Spine MRI w/ and w/o contrast

## 2020-02-11 NOTE — CONSULT NOTE ADULT - SUBJECTIVE AND OBJECTIVE BOX
Neurology Consult    Patient is a 39y old  Male who presents with a chief complaint of opiate overdose (2020 06:41)      HPI:    Patient is a 39 y o male w/ no past medical history, w/ past substance use history of heroin use disorder, in remission prior to day of presentation, who was BIBA after being found in his car unresponsive for 4-6h.  Patient was suspected to have overdosed on heroin and was given Narcan 6mg by EMD, w/ return of consciousness.  Patient subsequently became tachycardic to 150s and was found to be in Afib w/ RVR.  In the ED, he was found to have BENNY with a Cr of 3 and w/ elevated CK to 16,855.  He was also found to have respiratory acidosis on ABG with pH 7,1 and pCO2 62. He was given 1L NS and placed on BiPaP with improvement. CXR showed b/l infiltrates and was admitted to MICU for acute hypercapnic respiratory failure.  Patient is improved this morning and was complaining of severe low back pain this AM.  Neurology consult was placed for same.      On evaluation, patient reports that he began experiencing left lower back pain on Saturday prior to him being found in the car.  He notes lying on left side as provocative feature and lying on right side as palliative.  Describes the pain as shooting, constant, radiating to his upper back and down his left leg, anteriorly and posteriorly down to his knee with assocaited "pins and needles." Rates the pain as 10/10 w/ worsening in severity today.  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  Denies HA, blurry vision, dizziness, lightheadedness, numbness, tingling, neck pain, neck stiffness, abdominal pain, melena, BRBPR, hematuria, bowel or bladder incontinence trauma, fever, chills, n/v/d/c, cp,  pleuritic cp, palpitations, cough, wheezing, hemoptysis, calf pain/swelling/erythema, sick contacts, recent travel or rash.    PAST MEDICAL & SURGICAL HISTORY:  No pertinent past medical history  No significant past surgical history      FAMILY HISTORY:  No pertinent family history in first degree relatives      Social History: (-) x 3    Allergies    No Known Allergies    Intolerances        MEDICATIONS  (STANDING):  ampicillin/sulbactam  IVPB 1.5 Gram(s) IV Intermittent every 6 hours  chlorhexidine 4% Liquid 1 Application(s) Topical <User Schedule>  heparin  Injectable 5000 Unit(s) SubCutaneous every 8 hours  sodium chloride 0.9%. 1000 milliLiter(s) (150 mL/Hr) IV Continuous <Continuous>    MEDICATIONS  (PRN):      Review of systems:    Constitutional: No fever, weight loss or fatigue    Eyes: No eye pain or discharge  ENMT:  No difficulty hearing; No sinus or throat pain  Neck: No pain or stiffness  Respiratory: No cough, wheezing, chills or hemoptysis  Cardiovascular: No chest pain, palpitations, shortness of breath, dyspnea on exertion  Gastrointestinal: No abdominal pain, nausea, vomiting or hematemesis; No diarrhea or constipation.   Genitourinary: No dysuria, frequency, hematuria or incontinence  Neurological: As per HPI  Skin: No rashes or lesions   Endocrine: No heat or cold intolerance; No hair loss  Musculoskeletal: +left lower back pain -- shooting, constant, radiating to his upper back and down his left leg, anteriorly and posteriorly down to his knee with associated "pins and needles."  Psychiatric: No depression, anxiety, mood swings  Heme/Lymph: No easy bruising or bleeding gums    Vital Signs Last 24 Hrs  T(C): 36.8 (2020 12:00), Max: 37.2 (2020 08:00)  T(F): 98.2 (2020 12:00), Max: 98.9 (2020 08:00)  HR: 96 (2020 11:00) (82 - 108)  BP: 125/70 (2020 11:00) (110/53 - 147/66)  BP(mean): 93 (2020 11:00) (76 - 109)  RR: 21 (2020 12:00) (18 - 24)  SpO2: 97% (2020 12:00) (95% - 100%)    Neurologic Examination:  General:  Appearance is consistent with chronologic age.  No abnormal facies. Appears in moderate distress.  Diaphoretic.   General: The patient is oriented to person, place, time and date.  Recent and remote memory intact.  Fund of knowledge is intact and normal.  Language with normal repetition, comprehension and naming.  Nondysarthric.    Cranial nerves: intact VA, VFF.  EOMI w/o nystagmus, skew or reported double vision.  PERRL.  No ptosis/weakness of eyelid closure.  Facial sensation is normal with normal bite.  No facial asymmetry.  Hearing grossly intact b/l.  Palate elevates midline.  Tongue midline.  Motor examination:   Normal tone, bulk and range of motion.  No tenderness, twitching, tremors or involuntary movements.  Formal Muscle Strength Testing: (MRC grade R/L) 5/5 RUE, LUE, RLE; 3/5 LLE.  No observable drift.  Reflexes:   1+ b/l patellar reflexes; 2+ pectoralis, biceps, triceps, brachioradialis, and Achilles.  Plantar response downgoing b/l.  Jaw jerk, Latonia, clonus absent.  Sensory examination:   reports decreased sensation to palpation on left lumbar region.  Intact to light touch and pinprick, pain, temperature and proprioception and vibration in all extremities.  Cerebellum:   FTN/HKS intact with normal STEPHANIE in all limbs.  No dysmetria or dysdiadokinesia.  Gait not assessed due to limitation by severe pain    Labs:   CBC Full  -  ( 2020 07:21 )  WBC Count : 13.54 K/uL  RBC Count : 3.75 M/uL  Hemoglobin : 11.5 g/dL  Hematocrit : 34.4 %  Platelet Count - Automated : 161 K/uL  Mean Cell Volume : 91.7 fL  Mean Cell Hemoglobin : 30.7 pg  Mean Cell Hemoglobin Concentration : 33.4 g/dL  Auto Neutrophil # : 10.69 K/uL  Auto Lymphocyte # : 1.56 K/uL  Auto Monocyte # : 1.20 K/uL  Auto Eosinophil # : 0.00 K/uL  Auto Basophil # : 0.03 K/uL  Auto Neutrophil % : 79.0 %  Auto Lymphocyte % : 11.5 %  Auto Monocyte % : 8.9 %  Auto Eosinophil % : 0.0 %  Auto Basophil % : 0.2 %        143  |  109  |  15  ----------------------------<  109<H>  4.1   |  23  |  0.8    Ca    8.5      2020 11:25  Mg     2.3         TPro  5.8<L>  /  Alb  3.5  /  TBili  0.6  /  DBili  x   /  AST  605<H>  /  ALT  414<H>  /  AlkPhos  47      LIVER FUNCTIONS - ( 2020 07:21 )  Alb: 3.5 g/dL / Pro: 5.8 g/dL / ALK PHOS: 47 U/L / ALT: 414 U/L / AST: 605 U/L / GGT: x             Urinalysis Basic - ( 10 Feb 2020 04:00 )    Color: Yellow / Appearance: Turbid / S.025 / pH: x  Gluc: x / Ketone: Negative  / Bili: Negative / Urobili: <2 mg/dL   Blood: x / Protein: 100 mg/dL / Nitrite: Negative   Leuk Esterase: Negative / RBC: 32 /HPF / WBC 4 /HPF   Sq Epi: x / Non Sq Epi: 7 /HPF / Bacteria: 0.0          Neuroimaging: No Neuroimaging performed. Neurology Consult    Patient is a 39y old  Male who presents with a chief complaint of opiate overdose (2020 06:41)    HPI:  Patient is a 39 y o male w/ no past medical history, w/ past substance use history of heroin use disorder, in remission prior to day of presentation, who was BIBA after being found in his car unresponsive for 4-6h.  Patient was suspected to have overdosed on heroin and was given Narcan 6mg by EMD, w/ return of consciousness.  Patient subsequently became tachycardic to 150s and was found to be in Afib w/ RVR.  In the ED, he was found to have BENNY with a Cr of 3 and w/ elevated CK to 16,855.  He was also found to have respiratory acidosis on ABG with pH 7,1 and pCO2 62. He was given 1L NS and placed on BiPaP with improvement. CXR showed b/l infiltrates and was admitted to MICU for acute hypercapnic respiratory failure.  Patient is improved this morning and was complaining of severe low back pain this AM.  Neurology consult was placed for same.      On evaluation, patient reports that he began experiencing left lower back pain on Saturday prior to him being found in the car.  He notes lying on left side as provocative feature and lying on right side as palliative.  Describes the pain as shooting, constant, radiating to his upper back and down his left leg, anteriorly and posteriorly down to his knee with assocaited "pins and needles." Rates the pain as 10/10 w/ worsening in severity today.  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  Denies HA, blurry vision, dizziness, lightheadedness, numbness, tingling, neck pain, neck stiffness, abdominal pain, melena, BRBPR, hematuria, bowel or bladder incontinence trauma, fever, chills, n/v/d/c, cp,  pleuritic cp, palpitations, cough, wheezing, hemoptysis, calf pain/swelling/erythema, sick contacts, recent travel or rash.    PAST MEDICAL & SURGICAL HISTORY:  No pertinent past medical history  No significant past surgical history      FAMILY HISTORY:  No pertinent family history in first degree relatives      Social History: (-) x 3    Allergies    No Known Allergies    Intolerances        MEDICATIONS  (STANDING):  ampicillin/sulbactam  IVPB 1.5 Gram(s) IV Intermittent every 6 hours  chlorhexidine 4% Liquid 1 Application(s) Topical <User Schedule>  heparin  Injectable 5000 Unit(s) SubCutaneous every 8 hours  sodium chloride 0.9%. 1000 milliLiter(s) (150 mL/Hr) IV Continuous <Continuous>    MEDICATIONS  (PRN):      Review of systems:    Constitutional: No fever, weight loss or fatigue    Eyes: No eye pain or discharge  ENMT:  No difficulty hearing; No sinus or throat pain  Neck: No pain or stiffness  Respiratory: No cough, wheezing, chills or hemoptysis  Cardiovascular: No chest pain, palpitations, shortness of breath, dyspnea on exertion  Gastrointestinal: No abdominal pain, nausea, vomiting or hematemesis; No diarrhea or constipation.   Genitourinary: No dysuria, frequency, hematuria or incontinence  Neurological: As per HPI  Skin: No rashes or lesions   Endocrine: No heat or cold intolerance; No hair loss  Musculoskeletal: +left lower back pain -- shooting, constant, radiating to his upper back and down his left leg, anteriorly and posteriorly down to his knee with associated "pins and needles."  Psychiatric: No depression, anxiety, mood swings  Heme/Lymph: No easy bruising or bleeding gums    Vital Signs Last 24 Hrs  T(C): 36.8 (2020 12:00), Max: 37.2 (2020 08:00)  T(F): 98.2 (2020 12:00), Max: 98.9 (2020 08:00)  HR: 96 (2020 11:00) (82 - 108)  BP: 125/70 (2020 11:00) (110/53 - 147/66)  BP(mean): 93 (2020 11:00) (76 - 109)  RR: 21 (2020 12:00) (18 - 24)  SpO2: 97% (2020 12:00) (95% - 100%)    Neurologic Examination:  General:  Appearance is consistent with chronologic age.  No abnormal facies. Appears in moderate distress.  Diaphoretic.   General: The patient is oriented to person, place, time and date.  Recent and remote memory intact.  Fund of knowledge is intact and normal.  Language with normal repetition, comprehension and naming.  Nondysarthric.    Cranial nerves: intact VA, VFF.  EOMI w/o nystagmus, skew or reported double vision.  PERRL.  No ptosis/weakness of eyelid closure.  Facial sensation is normal with normal bite.  No facial asymmetry.  Hearing grossly intact b/l.  Palate elevates midline.  Tongue midline.  Motor examination:   Normal tone, bulk and range of motion.  No tenderness, twitching, tremors or involuntary movements.  Formal Muscle Strength Testing: (MRC grade R/L) 5/5 RUE, LUE, RLE; 3/5 LLE.  No observable drift.  Reflexes:   1+ b/l patellar reflexes; 2+ pectoralis, biceps, triceps, brachioradialis, and Achilles.  Plantar response downgoing b/l.  Jaw jerk, Latonia, clonus absent.  Sensory examination:   reports decreased sensation to palpation on left lumbar region.  Intact to light touch and pinprick, pain, temperature and proprioception and vibration in all extremities.  Cerebellum:   FTN/HKS intact with normal STEPHANIE in all limbs.  No dysmetria or dysdiadokinesia.  Gait not assessed due to limitation by severe pain    Labs:   CBC Full  -  ( 2020 07:21 )  WBC Count : 13.54 K/uL  RBC Count : 3.75 M/uL  Hemoglobin : 11.5 g/dL  Hematocrit : 34.4 %  Platelet Count - Automated : 161 K/uL  Mean Cell Volume : 91.7 fL  Mean Cell Hemoglobin : 30.7 pg  Mean Cell Hemoglobin Concentration : 33.4 g/dL  Auto Neutrophil # : 10.69 K/uL  Auto Lymphocyte # : 1.56 K/uL  Auto Monocyte # : 1.20 K/uL  Auto Eosinophil # : 0.00 K/uL  Auto Basophil # : 0.03 K/uL  Auto Neutrophil % : 79.0 %  Auto Lymphocyte % : 11.5 %  Auto Monocyte % : 8.9 %  Auto Eosinophil % : 0.0 %  Auto Basophil % : 0.2 %        143  |  109  |  15  ----------------------------<  109<H>  4.1   |  23  |  0.8    Ca    8.5      2020 11:25  Mg     2.3         TPro  5.8<L>  /  Alb  3.5  /  TBili  0.6  /  DBili  x   /  AST  605<H>  /  ALT  414<H>  /  AlkPhos  47      LIVER FUNCTIONS - ( 2020 07:21 )  Alb: 3.5 g/dL / Pro: 5.8 g/dL / ALK PHOS: 47 U/L / ALT: 414 U/L / AST: 605 U/L / GGT: x             Urinalysis Basic - ( 10 Feb 2020 04:00 )    Color: Yellow / Appearance: Turbid / S.025 / pH: x  Gluc: x / Ketone: Negative  / Bili: Negative / Urobili: <2 mg/dL   Blood: x / Protein: 100 mg/dL / Nitrite: Negative   Leuk Esterase: Negative / RBC: 32 /HPF / WBC 4 /HPF   Sq Epi: x / Non Sq Epi: 7 /HPF / Bacteria: 0.0          Neuroimaging: No Neuroimaging performed.

## 2020-02-11 NOTE — CHART NOTE - NSCHARTNOTEFT_GEN_A_CORE
39 year old man with PMHx opiod abuse ( was in patient detox, last 4 weeks op detox), presenting after being found in his car unresponsive was in car for 4 to 6 h. He snorts heroin daily. He was given narcan 6mg by EMS upon arrival. He then became tachycardia to the 150s and was found to be in Afib with RVR. Denied fever, chills, n/v/d/c, cp,  pleuritic cp, palpitations, cough, wheezing, hemoptysis, HA, blurry vision, dizziness, lightheadedness, numbness, tingling, neck pain, neck stiffness, abdominal pain, melena, BRBPR, hematuria, urinary incontinace, trauma, calf pain/swelling/erythema, sick contacts, recent travel or rash.    In ED, he was found to be in VICKY with a Cr of 3. High CK. He was also found to have respiratory acidosis on ABG with pH 7,1 and pCO2 62. He was given 1L NS and placed on BiPaP with improvement. CXR showed b/l infiltrates. WBC was 25. called for MICU. This AM more awake c/o mainly of back pain.    Pt was admitted for acute hyppercapnic respiratory failure d/t Opioid overdose s/p narcan. Pt also demonstrated signs of Vicky d/t Rhabdo and new onset afib w/ rvr.    #Acute Hypercapnic respiraoty failure - resolving  - c/w unasyn for suspected aspiration pneumonia  - possible detox consult    # Lumbar back pain  - pain management evaluation  -Prednisone 50mg PO w/ 10mg PO qdaily taper   -Gabapentin 300mg PO TID   -Robaxin 750mg PO TID   -L Spine MRI w/ and w/o contrast   - neuro onboard    #VICKY and Rhabdo  - CK trending down  - c/w iv fluids    #Afib w/ RVR - rate controled  - CHADVASC 1    Signout given 3001

## 2020-02-11 NOTE — PROGRESS NOTE ADULT - ASSESSMENT
IMPRESSION:    Acute hypercapnic respiratory failure/ Opioid overdose s/p narcan   BENNY secondary to rhabdomyolysis improving  New AFib with RVR  b/l infiltrates/ aspiration/ ? NCPE off BIPAP  SPARKLE    PLAN:    CNS: no depressants. Neuro eval for back pain, ? need MRI    HEENT: Oral care    PULMONARY:  HOB @ 45 degrees. repeat ABG    CARDIOVASCULAR: IVF ++, CE, trend CK, echo, cardio eval,    GI: GI prophylaxis.  start feeding    RENAL:  Follow up lytes.  Correct as needed. F/u CK. trend CMP    INFECTIOUS DISEASE: Follow up cultures: blood and urine. Unasyn for now    HEMATOLOGICAL:  DVT prophylaxis.    ENDOCRINE:  Follow up FS.    MUSCULOSKELETAL: increase ambulation as tolerated      if repeat CK is decreasing downgrade to tele  detox eval

## 2020-02-11 NOTE — PROGRESS NOTE ADULT - SUBJECTIVE AND OBJECTIVE BOX
OVERNIGHT EVENTS: off BIPAP all over feels better, main complaints lower back pain, no LE weakness    Vital Signs Last 24 Hrs  T(C): 36.9 (2020 04:00), Max: 36.9 (2020 04:00)  T(F): 98.4 (2020 04:00), Max: 98.4 (2020 04:00)  HR: 100 (2020 06:00) (82 - 108)  BP: 126/66 (2020 06:00) (110/53 - 147/66)  BP(mean): 91 (2020 06:00) (76 - 106)  RR: 22 (:00) (18 - 24)  SpO2: 99% (:00) (95% - 100%)    PHYSICAL EXAMINATION:    GENERAL: The patient is awake and alert in no apparent distress.     HEENT: Head is normocephalic and atraumatic. Extraocular muscles are intact. Mucous membranes are moist.    NECK: Supple.    LUNGS: Clear to auscultation without wheezing, rales or rhonchi; respirations unlabored    HEART: Regular rate and rhythm without murmur.    ABDOMEN: Soft, nontender, and nondistended.      EXTREMITIES: Without any cyanosis, clubbing, rash, lesions or edema.    NEUROLOGIC: non focal, pain on palpation lower back    SKIN: No ulceration or induration present.      LABS:                        12.6   23.31 )-----------( 170      ( 10 Feb 2020 06:37 )             38.4     02-10    142  |  109  |  24<H>  ----------------------------<  86  4.5   |  20  |  1.4    Ca    8.6      10 Feb 2020 20:38  Mg     2.1     02-10    TPro  6.5  /  Alb  4.1  /  TBili  0.5  /  DBili  0.2  /  AST  629<H>  /  ALT  409<H>  /  AlkPhos  55  02-10      Urinalysis Basic - ( 10 Feb 2020 04:00 )    Color: Yellow / Appearance: Turbid / S.025 / pH: x  Gluc: x / Ketone: Negative  / Bili: Negative / Urobili: <2 mg/dL   Blood: x / Protein: 100 mg/dL / Nitrite: Negative   Leuk Esterase: Negative / RBC: 32 /HPF / WBC 4 /HPF   Sq Epi: x / Non Sq Epi: 7 /HPF / Bacteria: 0.0      ABG - ( 10 Feb 2020 11:25 )  pH, Arterial: 7.22  pH, Blood: x     /  pCO2: 49    /  pO2: 152   / HCO3: 20    / Base Excess: -7.6  /  SaO2: 99                CARDIAC MARKERS ( 2020 00:50 )  x     / x     / 43795 U/L / x     / x      CARDIAC MARKERS ( 10 Feb 2020 20:38 )  x     / x     / 33591 U/L / x     / x      CARDIAC MARKERS ( 10 Feb 2020 16:36 )  x     / <0.01 ng/mL / 70046 U/L / x     / x      CARDIAC MARKERS ( 10 Feb 2020 12:00 )  x     / x     / 38379 U/L / x     / x      CARDIAC MARKERS ( 2020 23:28 )  x     / <0.01 ng/mL / x     / x     / x      CARDIAC MARKERS ( 2020 23:14 )  x     / x     / 8936 U/L / x     / x                      02-10-20 @ 07:01  -  20 @ 06:42  --------------------------------------------------------  IN: 2350 mL / OUT: 775 mL / NET: 1575 mL        MICROBIOLOGY:      MEDICATIONS  (STANDING):  ampicillin/sulbactam  IVPB 1.5 Gram(s) IV Intermittent every 6 hours  chlorhexidine 4% Liquid 1 Application(s) Topical <User Schedule>  heparin  Injectable 5000 Unit(s) SubCutaneous every 8 hours  sodium chloride 0.9%. 1000 milliLiter(s) (150 mL/Hr) IV Continuous <Continuous>    MEDICATIONS  (PRN):      RADIOLOGY & ADDITIONAL STUDIES:

## 2020-02-12 LAB
ALBUMIN SERPL ELPH-MCNC: 3.7 G/DL — SIGNIFICANT CHANGE UP (ref 3.5–5.2)
ALP SERPL-CCNC: 55 U/L — SIGNIFICANT CHANGE UP (ref 30–115)
ALT FLD-CCNC: 393 U/L — HIGH (ref 0–41)
ANION GAP SERPL CALC-SCNC: 11 MMOL/L — SIGNIFICANT CHANGE UP (ref 7–14)
AST SERPL-CCNC: 465 U/L — HIGH (ref 0–41)
BASOPHILS # BLD AUTO: 0.01 K/UL — SIGNIFICANT CHANGE UP (ref 0–0.2)
BASOPHILS NFR BLD AUTO: 0.1 % — SIGNIFICANT CHANGE UP (ref 0–1)
BILIRUB SERPL-MCNC: 0.7 MG/DL — SIGNIFICANT CHANGE UP (ref 0.2–1.2)
BUN SERPL-MCNC: 12 MG/DL — SIGNIFICANT CHANGE UP (ref 10–20)
CALCIUM SERPL-MCNC: 8.8 MG/DL — SIGNIFICANT CHANGE UP (ref 8.5–10.1)
CHLORIDE SERPL-SCNC: 105 MMOL/L — SIGNIFICANT CHANGE UP (ref 98–110)
CK SERPL-CCNC: HIGH U/L (ref 0–225)
CK SERPL-CCNC: HIGH U/L (ref 0–225)
CO2 SERPL-SCNC: 25 MMOL/L — SIGNIFICANT CHANGE UP (ref 17–32)
CREAT SERPL-MCNC: 0.8 MG/DL — SIGNIFICANT CHANGE UP (ref 0.7–1.5)
EOSINOPHIL # BLD AUTO: 0 K/UL — SIGNIFICANT CHANGE UP (ref 0–0.7)
EOSINOPHIL NFR BLD AUTO: 0 % — SIGNIFICANT CHANGE UP (ref 0–8)
GLUCOSE SERPL-MCNC: 127 MG/DL — HIGH (ref 70–99)
HCT VFR BLD CALC: 34.8 % — LOW (ref 42–52)
HGB BLD-MCNC: 11.7 G/DL — LOW (ref 14–18)
IMM GRANULOCYTES NFR BLD AUTO: 0.9 % — HIGH (ref 0.1–0.3)
LYMPHOCYTES # BLD AUTO: 1.09 K/UL — LOW (ref 1.2–3.4)
LYMPHOCYTES # BLD AUTO: 7.2 % — LOW (ref 20.5–51.1)
MAGNESIUM SERPL-MCNC: 2 MG/DL — SIGNIFICANT CHANGE UP (ref 1.8–2.4)
MCHC RBC-ENTMCNC: 30.6 PG — SIGNIFICANT CHANGE UP (ref 27–31)
MCHC RBC-ENTMCNC: 33.6 G/DL — SIGNIFICANT CHANGE UP (ref 32–37)
MCV RBC AUTO: 91.1 FL — SIGNIFICANT CHANGE UP (ref 80–94)
MONOCYTES # BLD AUTO: 1.22 K/UL — HIGH (ref 0.1–0.6)
MONOCYTES NFR BLD AUTO: 8.1 % — SIGNIFICANT CHANGE UP (ref 1.7–9.3)
NEUTROPHILS # BLD AUTO: 12.62 K/UL — HIGH (ref 1.4–6.5)
NEUTROPHILS NFR BLD AUTO: 83.7 % — HIGH (ref 42.2–75.2)
NRBC # BLD: 0 /100 WBCS — SIGNIFICANT CHANGE UP (ref 0–0)
PLATELET # BLD AUTO: 193 K/UL — SIGNIFICANT CHANGE UP (ref 130–400)
POTASSIUM SERPL-MCNC: 3.9 MMOL/L — SIGNIFICANT CHANGE UP (ref 3.5–5)
POTASSIUM SERPL-SCNC: 3.9 MMOL/L — SIGNIFICANT CHANGE UP (ref 3.5–5)
PROT SERPL-MCNC: 5.9 G/DL — LOW (ref 6–8)
RBC # BLD: 3.82 M/UL — LOW (ref 4.7–6.1)
RBC # FLD: 13 % — SIGNIFICANT CHANGE UP (ref 11.5–14.5)
SODIUM SERPL-SCNC: 141 MMOL/L — SIGNIFICANT CHANGE UP (ref 135–146)
WBC # BLD: 15.08 K/UL — HIGH (ref 4.8–10.8)
WBC # FLD AUTO: 15.08 K/UL — HIGH (ref 4.8–10.8)

## 2020-02-12 PROCEDURE — 72158 MRI LUMBAR SPINE W/O & W/DYE: CPT | Mod: 26

## 2020-02-12 PROCEDURE — 99233 SBSQ HOSP IP/OBS HIGH 50: CPT

## 2020-02-12 PROCEDURE — 99222 1ST HOSP IP/OBS MODERATE 55: CPT

## 2020-02-12 RX ORDER — ENOXAPARIN SODIUM 100 MG/ML
40 INJECTION SUBCUTANEOUS DAILY
Refills: 0 | Status: DISCONTINUED | OUTPATIENT
Start: 2020-02-12 | End: 2020-02-15

## 2020-02-12 RX ORDER — LIDOCAINE 4 G/100G
1 CREAM TOPICAL ONCE
Refills: 0 | Status: COMPLETED | OUTPATIENT
Start: 2020-02-12 | End: 2020-02-12

## 2020-02-12 RX ORDER — LIDOCAINE 4 G/100G
1 CREAM TOPICAL DAILY
Refills: 0 | Status: DISCONTINUED | OUTPATIENT
Start: 2020-02-12 | End: 2020-02-15

## 2020-02-12 RX ADMIN — METHOCARBAMOL 500 MILLIGRAM(S): 500 TABLET, FILM COATED ORAL at 14:45

## 2020-02-12 RX ADMIN — SODIUM CHLORIDE 200 MILLILITER(S): 9 INJECTION INTRAMUSCULAR; INTRAVENOUS; SUBCUTANEOUS at 21:28

## 2020-02-12 RX ADMIN — AMPICILLIN SODIUM AND SULBACTAM SODIUM 100 GRAM(S): 250; 125 INJECTION, POWDER, FOR SUSPENSION INTRAMUSCULAR; INTRAVENOUS at 11:34

## 2020-02-12 RX ADMIN — CHLORHEXIDINE GLUCONATE 1 APPLICATION(S): 213 SOLUTION TOPICAL at 05:00

## 2020-02-12 RX ADMIN — AMPICILLIN SODIUM AND SULBACTAM SODIUM 100 GRAM(S): 250; 125 INJECTION, POWDER, FOR SUSPENSION INTRAMUSCULAR; INTRAVENOUS at 05:15

## 2020-02-12 RX ADMIN — GABAPENTIN 300 MILLIGRAM(S): 400 CAPSULE ORAL at 05:15

## 2020-02-12 RX ADMIN — METHOCARBAMOL 500 MILLIGRAM(S): 500 TABLET, FILM COATED ORAL at 05:16

## 2020-02-12 RX ADMIN — LIDOCAINE 1 PATCH: 4 CREAM TOPICAL at 17:31

## 2020-02-12 RX ADMIN — AMPICILLIN SODIUM AND SULBACTAM SODIUM 100 GRAM(S): 250; 125 INJECTION, POWDER, FOR SUSPENSION INTRAMUSCULAR; INTRAVENOUS at 17:31

## 2020-02-12 RX ADMIN — LIDOCAINE 1 PATCH: 4 CREAM TOPICAL at 22:30

## 2020-02-12 RX ADMIN — LIDOCAINE 1 PATCH: 4 CREAM TOPICAL at 11:34

## 2020-02-12 RX ADMIN — AMPICILLIN SODIUM AND SULBACTAM SODIUM 100 GRAM(S): 250; 125 INJECTION, POWDER, FOR SUSPENSION INTRAMUSCULAR; INTRAVENOUS at 00:47

## 2020-02-12 RX ADMIN — GABAPENTIN 300 MILLIGRAM(S): 400 CAPSULE ORAL at 21:29

## 2020-02-12 RX ADMIN — SODIUM CHLORIDE 200 MILLILITER(S): 9 INJECTION INTRAMUSCULAR; INTRAVENOUS; SUBCUTANEOUS at 04:23

## 2020-02-12 RX ADMIN — Medication 50 MILLIGRAM(S): at 05:15

## 2020-02-12 RX ADMIN — METHOCARBAMOL 500 MILLIGRAM(S): 500 TABLET, FILM COATED ORAL at 21:29

## 2020-02-12 RX ADMIN — GABAPENTIN 300 MILLIGRAM(S): 400 CAPSULE ORAL at 14:45

## 2020-02-12 NOTE — CONSULT NOTE ADULT - SUBJECTIVE AND OBJECTIVE BOX
Chief Complaint:    HPI:  39 year old man with PMHx opiod abuse ( was in patient detox, last 4 weeks op detox), presenting after being found in his car unresponsive was in car for 4 to 6 h. He snorts heroin daily. He was given narcan 6mg by EMS upon arrival. He then became tachycardia to the 150s and was found to be in Afib with RVR.  Patient notes that he has 5 year hx of low back pain into the left leg that was the original reasoning for starting opioid medications      PAST MEDICAL & SURGICAL HISTORY:  No pertinent past medical history  No significant past surgical history      FAMILY HISTORY:  No pertinent family history in first degree relatives      SOCIAL HISTORY:  [x] Denies Smoking, Alcohol, or Drug Use    Allergies    No Known Allergies    Intolerances        PAIN MEDICATIONS:  gabapentin 300 milliGRAM(s) Oral three times a day  methocarbamol 500 milliGRAM(s) Oral three times a day    Heme:  enoxaparin Injectable 40 milliGRAM(s) SubCutaneous daily    Antibiotics:  ampicillin/sulbactam  IVPB 1.5 Gram(s) IV Intermittent every 6 hours    Cardiovascular:    GI:    Endocrine:    All Other Medications:  chlorhexidine 4% Liquid 1 Application(s) Topical <User Schedule>  lidocaine   Patch 1 Patch Transdermal once  lidocaine   Patch 1 Patch Transdermal daily  sodium chloride 0.9%. 1000 milliLiter(s) IV Continuous <Continuous>      REVIEW OF SYSTEMS:    CONSTITUTIONAL: No fever, weight loss, or fatigue  EYES: No eye pain, visual disturbances, or discharge  ENMT:  No difficulty hearing, tinnitus, vertigo; No sinus or throat pain  NECK: No pain or stiffness  BREASTS: No pain, masses, or nipple discharge  RESPIRATORY: No cough, wheezing, chills or hemoptysis; No shortness of breath  CARDIOVASCULAR: No chest pain, palpitations, dizziness, or leg swelling  GASTROINTESTINAL: No abdominal or epigastric pain. No nausea, vomiting, or hematemesis; No diarrhea or constipation. No melena or hematochezia.  GENITOURINARY: No dysuria, frequency, hematuria, or incontinence  NEUROLOGICAL: No headaches, memory loss, loss of strength, numbness, or tremors  SKIN: No itching, burning, rashes, or lesions   LYMPH NODES: No enlarged glands  ENDOCRINE: No heat or cold intolerance; No hair loss  MUSCULOSKELETAL: As per HPI  PSYCHIATRIC: No depression, anxiety, mood swings, or difficulty sleeping  HEME/LYMPH: No easy bruising, or bleeding gums  ALLERY AND IMMUNOLOGIC: No hives or eczema      Vital Signs Last 24 Hrs  T(C): 36.9 (12 Feb 2020 07:41), Max: 37.6 (11 Feb 2020 23:35)  T(F): 98.4 (12 Feb 2020 07:41), Max: 99.6 (11 Feb 2020 23:35)  HR: 93 (12 Feb 2020 07:41) (86 - 96)  BP: 119/74 (12 Feb 2020 07:41) (117/56 - 160/84)  BP(mean): 108 (11 Feb 2020 20:00) (81 - 110)  RR: 18 (12 Feb 2020 07:41) (16 - 20)  SpO2: 97% (12 Feb 2020 07:41) (94% - 97%)    PAIN SCORE:   7      SCALE USED: (1-10 VNRS)             PHYSICAL EXAM:    GENERAL: NAD, well-groomed, well-developed  HEAD:  Atraumatic, Normocephalic  EYES: EOMI, PERRLA, conjunctiva and sclera clear  ENMT: No tonsillar erythema, exudates, or enlargement; Moist mucous membranes, Good dentition, No lesions  MUSCULOSKELETAL: TTP over low back  NERVOUS SYSTEM:  Alert & Oriented X3, Good concentration; Motor Strength 5/5 B/L upper and lower extremities; DTRs 2+ intact and symmetric  CHEST/LUNG: Clear to percussion bilaterally; No rales, rhonchi, wheezing, or rubs  HEART: Regular rate and rhythm; No murmurs, rubs, or gallops  ABDOMEN: Soft, Nontender, Nondistended; Bowel sounds present  EXTREMITIES:  2+ Peripheral Pulses, No clubbing, cyanosis, or edema  LYMPH: No lymphadenopathy noted  SKIN: No rashes or lesions        LABS:                          11.7   15.08 )-----------( 193      ( 12 Feb 2020 11:52 )             34.8     02-12    141  |  105  |  12  ----------------------------<  127<H>  3.9   |  25  |  0.8    Ca    8.8      12 Feb 2020 11:52  Mg     2.0     02-12    TPro  5.9<L>  /  Alb  3.7  /  TBili  0.7  /  DBili  x   /  AST  465<H>  /  ALT  393<H>  /  AlkPhos  55  02-12

## 2020-02-12 NOTE — CONSULT NOTE ADULT - ASSESSMENT
Hypercapnic resp failure secondary to heroine over dose  ? asp PNA on AB  new onset Afib : converted back to sinus , likely induced by hypoxia and acidosis               no recent cocaine use or excessive alcohol intake               does complaint of occasional palpitation after drinking               no structural heart disease on echo, CHADSVASC 0  rhabdo/maynor : improving  back pain awaiting MRI    plan:  IV fluids  AB for asp PNA  no need for AC or rate control , as Afib episode was transient and inducted by hypoxia/acidosis, ??? drug overdose  follow up with cardio as outpt Hypercapnic resp failure secondary to heroine over dose  ? asp PNA on AB  new onset Afib : converted back to sinus , likely induced by hypoxia and acidosis               no recent cocaine use or excessive alcohol intake               does complaint of occasional palpitation after drinking               no structural heart disease on echo, CHADSVASC 0  rhabdo/maynor : improving  back pain awaiting MRI    plan:  IV fluids  AB for asp PNA  no need for AC or rate control , as Afib episode was transient and inducted by hypoxia/acidosis, ??? drug overdose  avoid drug, EtOH abuse  follow up with [facility] as outpt.

## 2020-02-12 NOTE — CONSULT NOTE ADULT - SUBJECTIVE AND OBJECTIVE BOX
HPI:  39 year old man with PMHx opiod abuse ( was in patient detox, last 4 weeks op detox), presenting after being found in his car unresponsive was in car for 4 to 6 h. He snorts heroin daily. He was given narcan 6mg by EMS upon arrival. He then became tachycardia to the 150s and was found to be in Afib with RVR. Denied fever, chills, n/v/d/c, cp,  pleuritic cp, palpitations, cough, wheezing, hemoptysis, HA, blurry vision, dizziness, lightheadedness, numbness, tingling, neck pain, neck stiffness, abdominal pain, melena, BRBPR, hematuria, urinary incontinace, trauma, calf pain/swelling/erythema, sick contacts, recent travel or rash.  In ED, he was found to be in BENNY with a Cr of 3. High CK. He was also found to have respiratory acidosis on ABG with pH 7,1 and pCO2 62. He was given 1L NS and placed on BiPaP with improvement. CXR showed b/l infiltrates. WBC was 25. called for MICU. This AM more awake c/o mainly of back pain. (10 Feb 2020 01:09)  cardiology called for evaluation of new onset Afib.      PAST MEDICAL & SURGICAL HISTORY  No pertinent past medical history  No significant past surgical history      FAMILY HISTORY:  FAMILY HISTORY:  No pertinent family history in first degree relatives      SOCIAL HISTORY:  [x]smoker  [x]Alcohol  [x]Drug    ALLERGIES:  No Known Allergies      MEDICATIONS:  MEDICATIONS  (STANDING):  ampicillin/sulbactam  IVPB 1.5 Gram(s) IV Intermittent every 6 hours  chlorhexidine 4% Liquid 1 Application(s) Topical <User Schedule>  gabapentin 300 milliGRAM(s) Oral three times a day  heparin  Injectable 5000 Unit(s) SubCutaneous every 8 hours  lidocaine   Patch 1 Patch Transdermal daily  methocarbamol 500 milliGRAM(s) Oral three times a day  sodium chloride 0.9%. 1000 milliLiter(s) (200 mL/Hr) IV Continuous <Continuous>    MEDICATIONS  (PRN):      HOME MEDICATIONS:  Home Medications:      VITALS:   T(F): 98.4 (02-12 @ 07:41), Max: 99.6 (02-11 @ 23:35)  HR: 93 (02-12 @ 07:41) (82 - 153)  BP: 119/74 (02-12 @ 07:41) (84/54 - 160/84)  BP(mean): 108 (02-11 @ 20:00) (76 - 115)  RR: 18 (02-12 @ 07:41) (16 - 24)  SpO2: 97% (02-12 @ 07:41) (94% - 100%)    I&O's Summary    11 Feb 2020 07:01  -  12 Feb 2020 07:00  --------------------------------------------------------  IN: 3836 mL / OUT: 1200 mL / NET: 2636 mL        REVIEW OF SYSTEMS:  CONSTITUTIONAL: No weakness, fevers or chills  EYES: No visual changes  ENT: No vertigo or throat pain   NECK: No pain or stiffness  RESPIRATORY: No cough, wheezing, hemoptysis; No shortness of breath  CARDIOVASCULAR: see HPI  GASTROINTESTINAL: No abdominal or epigastric pain. No nausea, vomiting, or hematemesis; No diarrhea or constipation. No melena or hematochezia.  GENITOURINARY: No dysuria, frequency or hematuria  NEUROLOGICAL: No numbness or weakness  SKIN: No itching, no rashes  MSK: no joint pain    PHYSICAL EXAM:  NEURO: patient is awake , alert and oriented  GEN: Not in acute distress  NECK: no thyroid enlargement, no JVD, left IJ TLC  LUNGS: Clear to auscultation bilaterally   CARDIOVASCULAR: S1/S2 present, RRR , no murmurs   ABD: Soft, non-tender, non-distended, +BS  EXT: No JANETT  SKIN: Intact    LABS:                        11.5   13.54 )-----------( 161      ( 11 Feb 2020 07:21 )             34.4     02-11    143  |  109  |  15  ----------------------------<  109<H>  4.1   |  23  |  0.8    Ca    8.5      11 Feb 2020 11:25  Mg     2.3     02-11    TPro  5.8<L>  /  Alb  3.5  /  TBili  0.6  /  DBili  x   /  AST  605<H>  /  ALT  414<H>  /  AlkPhos  47  02-11      Creatine Kinase, Serum: >32689 U/L <H> (02-11-20 @ 16:22)    CARDIAC MARKERS ( 11 Feb 2020 16:22 )  x     / x     / >07012 U/L / x     / x      CARDIAC MARKERS ( 11 Feb 2020 11:25 )  x     / x     / 81143 U/L / x     / x      CARDIAC MARKERS ( 11 Feb 2020 00:50 )  x     / x     / 57393 U/L / x     / x      CARDIAC MARKERS ( 10 Feb 2020 20:38 )  x     / x     / 14589 U/L / x     / x      CARDIAC MARKERS ( 10 Feb 2020 16:36 )  x     / <0.01 ng/mL / 92339 U/L / x     / x            RADIOLOGY:  -CXR: < from: Xray Chest 1 View- PORTABLE-Routine (02.11.20 @ 06:36) >  Impression:      Bibasilar opacities, left greater than right. These have improved since prior exam.    < end of copied text >    -TTE: < from: Transthoracic Echocardiogram (02.10.20 @ 11:50) >  Summary:   1. LV Ejection Fraction by Eng's Method with a biplane EF of 59 %.   2. Normal left ventricular size and wall thicknesses, with normal systolic and diastolic function.   3. LA volume Index is 18.1 ml/m² ml/m2.    < end of copied text >    -CCTA:  -STRESS TEST:  -CATHETERIZATION:    ECG:  < from: 12 Lead ECG (02.09.20 @ 23:25) >  Diagnosis Line Atrial fibrillation with rapid ventricular response  Abnormal ECG    < end of copied text > HPI:  39 year old man with PMHx opiod abuse ( was in patient detox, last 4 weeks op detox), presenting after being found in his car unresponsive was in car for 4 to 6 h. He snorts heroin daily. He was given narcan 6mg by EMS upon arrival. He then became tachycardia to the 150s and was found to be in Afib with RVR. Denied fever, chills, n/v/d/c, cp,  pleuritic cp, palpitations, cough, wheezing, hemoptysis, HA, blurry vision, dizziness, lightheadedness, numbness, tingling, neck pain, neck stiffness, abdominal pain, melena, BRBPR, hematuria, urinary incontinace, trauma, calf pain/swelling/erythema, sick contacts, recent travel or rash.  In ED, he was found to be in BENNY with a Cr of 3. High CK. He was also found to have respiratory acidosis on ABG with pH 7,1 and pCO2 62. He was given 1L NS and placed on BiPaP with improvement. CXR showed b/l infiltrates. WBC was 25. called for MICU. This AM more awake c/o mainly of back pain. (10 Feb 2020 01:09)  cardiology called for evaluation of new onset Afib.      PAST MEDICAL & SURGICAL HISTORY  No pertinent past medical history  No significant past surgical history      FAMILY HISTORY:  FAMILY HISTORY:  No pertinent family history in first degree relatives      SOCIAL HISTORY:  [x]smoker  [x]Alcohol  [x]Drug    ALLERGIES:  No Known Allergies      MEDICATIONS:  MEDICATIONS  (STANDING):  ampicillin/sulbactam  IVPB 1.5 Gram(s) IV Intermittent every 6 hours  chlorhexidine 4% Liquid 1 Application(s) Topical <User Schedule>  gabapentin 300 milliGRAM(s) Oral three times a day  heparin  Injectable 5000 Unit(s) SubCutaneous every 8 hours  lidocaine   Patch 1 Patch Transdermal daily  methocarbamol 500 milliGRAM(s) Oral three times a day  sodium chloride 0.9%. 1000 milliLiter(s) (200 mL/Hr) IV Continuous <Continuous>    MEDICATIONS  (PRN):      HOME MEDICATIONS:  Home Medications:      VITALS:   T(F): 98.4 (02-12 @ 07:41), Max: 99.6 (02-11 @ 23:35)  HR: 93 (02-12 @ 07:41) (82 - 153)  BP: 119/74 (02-12 @ 07:41) (84/54 - 160/84)  BP(mean): 108 (02-11 @ 20:00) (76 - 115)  RR: 18 (02-12 @ 07:41) (16 - 24)  SpO2: 97% (02-12 @ 07:41) (94% - 100%)    I&O's Summary    11 Feb 2020 07:01  -  12 Feb 2020 07:00  --------------------------------------------------------  IN: 3836 mL / OUT: 1200 mL / NET: 2636 mL        REVIEW OF SYSTEMS:  CONSTITUTIONAL: No weakness, fevers or chills  EYES: No visual changes  ENT: No vertigo or throat pain   NECK: No pain or stiffness  RESPIRATORY: No cough, wheezing, hemoptysis; No shortness of breath  CARDIOVASCULAR: see HPI  GASTROINTESTINAL: No abdominal or epigastric pain. No nausea, vomiting, or hematemesis; No diarrhea or constipation. No melena or hematochezia.  GENITOURINARY: No dysuria, frequency or hematuria  NEUROLOGICAL: No numbness or weakness  SKIN: No itching, no rashes  MSK: no joint pain    PHYSICAL EXAM:  NEURO: patient is awake , alert and oriented  GEN: Not in acute distress  HEENT: NC/AT  NECK: no thyroid enlargement, no JVD, left IJ TLC  LUNGS: Clear to auscultation bilaterally   CARDIOVASCULAR: S1/S2 present, RRR , no murmurs   ABD: Soft, non-tender, non-distended, +BS  EXT: No JANETT  SKIN: Intact    LABS:                        11.5   13.54 )-----------( 161      ( 11 Feb 2020 07:21 )             34.4     02-11    143  |  109  |  15  ----------------------------<  109<H>  4.1   |  23  |  0.8    Ca    8.5      11 Feb 2020 11:25  Mg     2.3     02-11    TPro  5.8<L>  /  Alb  3.5  /  TBili  0.6  /  DBili  x   /  AST  605<H>  /  ALT  414<H>  /  AlkPhos  47  02-11      Creatine Kinase, Serum: >38855 U/L <H> (02-11-20 @ 16:22)    CARDIAC MARKERS ( 11 Feb 2020 16:22 )  x     / x     / >28501 U/L / x     / x      CARDIAC MARKERS ( 11 Feb 2020 11:25 )  x     / x     / 37117 U/L / x     / x      CARDIAC MARKERS ( 11 Feb 2020 00:50 )  x     / x     / 32117 U/L / x     / x      CARDIAC MARKERS ( 10 Feb 2020 20:38 )  x     / x     / 84590 U/L / x     / x      CARDIAC MARKERS ( 10 Feb 2020 16:36 )  x     / <0.01 ng/mL / 05584 U/L / x     / x            RADIOLOGY:  -CXR: < from: Xray Chest 1 View- PORTABLE-Routine (02.11.20 @ 06:36) >  Impression:      Bibasilar opacities, left greater than right. These have improved since prior exam.    < end of copied text >    -TTE: < from: Transthoracic Echocardiogram (02.10.20 @ 11:50) >  Summary:   1. LV Ejection Fraction by Eng's Method with a biplane EF of 59 %.   2. Normal left ventricular size and wall thicknesses, with normal systolic and diastolic function.   3. LA volume Index is 18.1 ml/m² ml/m2.    < end of copied text >    -CCTA:  -STRESS TEST:  -CATHETERIZATION:    ECG:  < from: 12 Lead ECG (02.09.20 @ 23:25) >  Diagnosis Line Atrial fibrillation with rapid ventricular response  Abnormal ECG    < end of copied text >

## 2020-02-12 NOTE — CONSULT NOTE ADULT - ASSESSMENT
38 y/o with Lumbago    1.  MRI reviewed and no pathology found consistent with patient's pain    2.  Tylenol and Toradol PRN pain    3.  No opioid medications    4.  Follow up as out patient's

## 2020-02-12 NOTE — PROGRESS NOTE ADULT - ATTENDING COMMENTS
Pt seen and examined at bedside independently. No cp or sob. Pt states that he has had chronic back pain. He says has lumbar disk herniations.   Previously seen pain management docs, all they gave is pain meds. No injections offered. Turned to heroin when pain meds not helping. Did heroin the other day prior to OD. No intentional. Pt works as a . Family and pt want rehab. Pt was in rehab and 1 day after finishing he relapsed and caused him to od.     Vital Signs (24 Hrs):  T(C): 37.7 (02-12-20 @ 16:12), Max: 37.7 (02-12-20 @ 16:12)  HR: 99 (02-12-20 @ 16:12) (86 - 99)  BP: 139/83 (02-12-20 @ 16:12) (117/56 - 160/84)  RR: 18 (02-12-20 @ 16:12) (16 - 18)  SpO2: 94% (02-12-20 @ 16:12) (94% - 97%)  Wt(kg): --  Daily     Daily     I&O's Summary    11 Feb 2020 07:01  -  12 Feb 2020 07:00  --------------------------------------------------------  IN: 3836 mL / OUT: 1200 mL / NET: 2636 mL    PHYSICAL EXAM:  GENERAL: NAD, well-developed  HEAD:  Atraumatic, Normocephalic  EYES: EOMI, PERRLA, conjunctiva and sclera clear  NECK: Supple, No JVD  CHEST/LUNG: Clear to auscultation bilaterally; No wheeze  HEART: Regular rate and rhythm; No murmurs, rubs, or gallops  ABDOMEN: Soft, Nontender, Nondistended; Bowel sounds present  EXTREMITIES:  2+ Peripheral Pulses, No clubbing, cyanosis, or edema  PSYCH: AAOx3  NEUROLOGY: non-focal  SKIN: No rashes or lesions    I agree with the above plan.   #Progress Note Handoff  Pending (specify):  Follow up mri, pain magnament and CK. continue fluids.   Family discussion: dw pt and family and agreed to plan  Disposition: Home__/SNF___/Other___drug rehab_____/Unknown at this time________

## 2020-02-12 NOTE — PROGRESS NOTE ADULT - ASSESSMENT
39 year old man with PMHx opiod abuse ( was in patient detox, last 4 weeks op detox), presenting after being found in his car unresponsive was in car for 4 to 6 h. He snorts heroin daily. Admitted to ICU for acute hypercapnic respiratory failure d/t Opioid overdose s/p Narcan Pt also demonstrated signs of BENNY d/t Rhabdo and new onset afib w/ rvr.    # Acute Hypercapnic respiratory failure - resolving  # Suspected opioid overdose  - CXR with bibasilar opacities L>R. Improved since prior  - Continue Unasyn for suspected aspiration pneumonia.  - No tox screen. Check urine and serum tox screen  - Outpatient detox (reports that he called them already)    # Lumbar back pain  - Neuro eval  - Prednisone 50mg PO w/ 10mg PO qdaily taper   - Gabapentin 300mg PO TID   - Robaxin 750mg PO TID   - Trial of lidoderm patch  - L Spine MRI w/ and w/o contrast   - Pain management eval    # Rhabdomyolysis  - CPK >20,000. Downtrending  - Continue with aggressive hydration    # BENNY - resolved    #Afib w/ RVR - converted back to sinus  - Likely due to hypoxia and acidosis  - CHADVASC 0    GI ppx: not indicated  DVT ppx: Lovenox    Dispo: from home. Pending improvement of rhabdo. Likely outpatient detox

## 2020-02-12 NOTE — PROGRESS NOTE ADULT - SUBJECTIVE AND OBJECTIVE BOX
SUBJECTIVE:    Patient is a 39y old Male who presents with a chief complaint of opiate overdose (12 Feb 2020 12:12)    Currently admitted to medicine with the primary diagnosis of Opiate overdose     Today is hospital day 2d. This morning he is resting comfortably in bed and reports no new issues or overnight events.     INTERVAL EVENTS: CPK trending down    PAST MEDICAL & SURGICAL HISTORY  No pertinent past medical history  No significant past surgical history      ALLERGIES:  No Known Allergies    MEDICATIONS:  STANDING MEDICATIONS  ampicillin/sulbactam  IVPB 1.5 Gram(s) IV Intermittent every 6 hours  chlorhexidine 4% Liquid 1 Application(s) Topical <User Schedule>  gabapentin 300 milliGRAM(s) Oral three times a day  heparin  Injectable 5000 Unit(s) SubCutaneous every 8 hours  lidocaine   Patch 1 Patch Transdermal daily  methocarbamol 500 milliGRAM(s) Oral three times a day  sodium chloride 0.9%. 1000 milliLiter(s) IV Continuous <Continuous>    PRN MEDICATIONS    VITALS:   T(F): 98.4  HR: 93  BP: 119/74  RR: 18  SpO2: 97%    LABS:                        11.7   15.08 )-----------( 193      ( 12 Feb 2020 11:52 )             34.8     02-11    143  |  109  |  15  ----------------------------<  109<H>  4.1   |  23  |  0.8    Ca    8.5      11 Feb 2020 11:25  Mg     2.3     02-11    TPro  5.8<L>  /  Alb  3.5  /  TBili  0.6  /  DBili  x   /  AST  605<H>  /  ALT  414<H>  /  AlkPhos  47  02-11          Creatine Kinase, Serum: >75724 U/L <H> (02-11-20 @ 16:22)      Culture - Blood (collected 11 Feb 2020 00:50)  Source: .Blood None  Preliminary Report (12 Feb 2020 07:01):    No growth to date.    Culture - Blood (collected 11 Feb 2020 00:50)  Source: .Blood None  Preliminary Report (12 Feb 2020 07:01):    No growth to date.    Culture - Blood (collected 10 Feb 2020 06:37)  Source: .Blood None  Preliminary Report (11 Feb 2020 14:01):    No growth to date.    Culture - Urine (collected 10 Feb 2020 04:00)  Source: .Urine Catheterized  Final Report (11 Feb 2020 08:53):    No growth      CARDIAC MARKERS ( 11 Feb 2020 16:22 )  x     / x     / >91207 U/L / x     / x      CARDIAC MARKERS ( 11 Feb 2020 11:25 )  x     / x     / 40198 U/L / x     / x      CARDIAC MARKERS ( 11 Feb 2020 00:50 )  x     / x     / 39957 U/L / x     / x      CARDIAC MARKERS ( 10 Feb 2020 20:38 )  x     / x     / 55908 U/L / x     / x      CARDIAC MARKERS ( 10 Feb 2020 16:36 )  x     / <0.01 ng/mL / 41332 U/L / x     / x          RADIOLOGY:    < from: Xray Chest 1 View- PORTABLE-Routine (02.11.20 @ 06:36) >  Impression:      Bibasilar opacities, left greater than right. These have improved since prior exam.    < end of copied text >    PHYSICAL EXAM:  GEN: No acute distress  PULM/CHEST: Bilateral coarse breath sounds  CVS: Regular rate and rhythm, S1-S2, no murmurs  ABD: Soft, non-tender, non-distended, +BS  EXT: No edema  NEURO: AAOx3    Lenz Catheter:   Indwelling Urethral Catheter:     Connect To:  Straight Drainage/Gravity    Indication:  Urine Output Monitoring in Critically Ill (02-10-20 @ 00:54) (not performed) [active]

## 2020-02-13 LAB
ALBUMIN SERPL ELPH-MCNC: 3.4 G/DL — LOW (ref 3.5–5.2)
ALP SERPL-CCNC: 52 U/L — SIGNIFICANT CHANGE UP (ref 30–115)
ALT FLD-CCNC: 341 U/L — HIGH (ref 0–41)
ANION GAP SERPL CALC-SCNC: 12 MMOL/L — SIGNIFICANT CHANGE UP (ref 7–14)
AST SERPL-CCNC: 361 U/L — HIGH (ref 0–41)
BASOPHILS # BLD AUTO: 0.05 K/UL — SIGNIFICANT CHANGE UP (ref 0–0.2)
BASOPHILS NFR BLD AUTO: 0.4 % — SIGNIFICANT CHANGE UP (ref 0–1)
BILIRUB SERPL-MCNC: 0.6 MG/DL — SIGNIFICANT CHANGE UP (ref 0.2–1.2)
BUN SERPL-MCNC: 12 MG/DL — SIGNIFICANT CHANGE UP (ref 10–20)
CALCIUM SERPL-MCNC: 8.5 MG/DL — SIGNIFICANT CHANGE UP (ref 8.5–10.1)
CHLORIDE SERPL-SCNC: 106 MMOL/L — SIGNIFICANT CHANGE UP (ref 98–110)
CK SERPL-CCNC: HIGH U/L (ref 0–225)
CO2 SERPL-SCNC: 23 MMOL/L — SIGNIFICANT CHANGE UP (ref 17–32)
CREAT SERPL-MCNC: 0.9 MG/DL — SIGNIFICANT CHANGE UP (ref 0.7–1.5)
EOSINOPHIL # BLD AUTO: 0.02 K/UL — SIGNIFICANT CHANGE UP (ref 0–0.7)
EOSINOPHIL NFR BLD AUTO: 0.2 % — SIGNIFICANT CHANGE UP (ref 0–8)
GLUCOSE SERPL-MCNC: 101 MG/DL — HIGH (ref 70–99)
HCT VFR BLD CALC: 35.6 % — LOW (ref 42–52)
HGB BLD-MCNC: 12.1 G/DL — LOW (ref 14–18)
IMM GRANULOCYTES NFR BLD AUTO: 0.6 % — HIGH (ref 0.1–0.3)
LYMPHOCYTES # BLD AUTO: 2.79 K/UL — SIGNIFICANT CHANGE UP (ref 1.2–3.4)
LYMPHOCYTES # BLD AUTO: 21.4 % — SIGNIFICANT CHANGE UP (ref 20.5–51.1)
MAGNESIUM SERPL-MCNC: 1.7 MG/DL — LOW (ref 1.8–2.4)
MCHC RBC-ENTMCNC: 30.9 PG — SIGNIFICANT CHANGE UP (ref 27–31)
MCHC RBC-ENTMCNC: 34 G/DL — SIGNIFICANT CHANGE UP (ref 32–37)
MCV RBC AUTO: 90.8 FL — SIGNIFICANT CHANGE UP (ref 80–94)
MONOCYTES # BLD AUTO: 1.39 K/UL — HIGH (ref 0.1–0.6)
MONOCYTES NFR BLD AUTO: 10.7 % — HIGH (ref 1.7–9.3)
NEUTROPHILS # BLD AUTO: 8.68 K/UL — HIGH (ref 1.4–6.5)
NEUTROPHILS NFR BLD AUTO: 66.7 % — SIGNIFICANT CHANGE UP (ref 42.2–75.2)
NRBC # BLD: 0 /100 WBCS — SIGNIFICANT CHANGE UP (ref 0–0)
PLATELET # BLD AUTO: 190 K/UL — SIGNIFICANT CHANGE UP (ref 130–400)
POTASSIUM SERPL-MCNC: 3.5 MMOL/L — SIGNIFICANT CHANGE UP (ref 3.5–5)
POTASSIUM SERPL-SCNC: 3.5 MMOL/L — SIGNIFICANT CHANGE UP (ref 3.5–5)
PROT SERPL-MCNC: 5.8 G/DL — LOW (ref 6–8)
RBC # BLD: 3.92 M/UL — LOW (ref 4.7–6.1)
RBC # FLD: 12.8 % — SIGNIFICANT CHANGE UP (ref 11.5–14.5)
SODIUM SERPL-SCNC: 141 MMOL/L — SIGNIFICANT CHANGE UP (ref 135–146)
WBC # BLD: 13.01 K/UL — HIGH (ref 4.8–10.8)
WBC # FLD AUTO: 13.01 K/UL — HIGH (ref 4.8–10.8)

## 2020-02-13 PROCEDURE — 99231 SBSQ HOSP IP/OBS SF/LOW 25: CPT

## 2020-02-13 PROCEDURE — 99233 SBSQ HOSP IP/OBS HIGH 50: CPT

## 2020-02-13 RX ORDER — SODIUM CHLORIDE 9 MG/ML
1000 INJECTION INTRAMUSCULAR; INTRAVENOUS; SUBCUTANEOUS
Refills: 0 | Status: DISCONTINUED | OUTPATIENT
Start: 2020-02-13 | End: 2020-02-15

## 2020-02-13 RX ORDER — MAGNESIUM SULFATE 500 MG/ML
2 VIAL (ML) INJECTION ONCE
Refills: 0 | Status: COMPLETED | OUTPATIENT
Start: 2020-02-13 | End: 2020-02-13

## 2020-02-13 RX ADMIN — METHOCARBAMOL 500 MILLIGRAM(S): 500 TABLET, FILM COATED ORAL at 21:42

## 2020-02-13 RX ADMIN — METHOCARBAMOL 500 MILLIGRAM(S): 500 TABLET, FILM COATED ORAL at 15:23

## 2020-02-13 RX ADMIN — Medication 40 MILLIGRAM(S): at 05:08

## 2020-02-13 RX ADMIN — AMPICILLIN SODIUM AND SULBACTAM SODIUM 100 GRAM(S): 250; 125 INJECTION, POWDER, FOR SUSPENSION INTRAMUSCULAR; INTRAVENOUS at 05:08

## 2020-02-13 RX ADMIN — METHOCARBAMOL 500 MILLIGRAM(S): 500 TABLET, FILM COATED ORAL at 05:08

## 2020-02-13 RX ADMIN — AMPICILLIN SODIUM AND SULBACTAM SODIUM 100 GRAM(S): 250; 125 INJECTION, POWDER, FOR SUSPENSION INTRAMUSCULAR; INTRAVENOUS at 00:07

## 2020-02-13 RX ADMIN — SODIUM CHLORIDE 150 MILLILITER(S): 9 INJECTION INTRAMUSCULAR; INTRAVENOUS; SUBCUTANEOUS at 21:40

## 2020-02-13 RX ADMIN — AMPICILLIN SODIUM AND SULBACTAM SODIUM 100 GRAM(S): 250; 125 INJECTION, POWDER, FOR SUSPENSION INTRAMUSCULAR; INTRAVENOUS at 18:27

## 2020-02-13 RX ADMIN — GABAPENTIN 300 MILLIGRAM(S): 400 CAPSULE ORAL at 15:23

## 2020-02-13 RX ADMIN — AMPICILLIN SODIUM AND SULBACTAM SODIUM 100 GRAM(S): 250; 125 INJECTION, POWDER, FOR SUSPENSION INTRAMUSCULAR; INTRAVENOUS at 12:00

## 2020-02-13 RX ADMIN — GABAPENTIN 300 MILLIGRAM(S): 400 CAPSULE ORAL at 05:08

## 2020-02-13 RX ADMIN — LIDOCAINE 1 PATCH: 4 CREAM TOPICAL at 00:15

## 2020-02-13 RX ADMIN — GABAPENTIN 300 MILLIGRAM(S): 400 CAPSULE ORAL at 21:42

## 2020-02-13 RX ADMIN — Medication 50 GRAM(S): at 08:05

## 2020-02-13 RX ADMIN — LIDOCAINE 1 PATCH: 4 CREAM TOPICAL at 15:51

## 2020-02-13 RX ADMIN — LIDOCAINE 1 PATCH: 4 CREAM TOPICAL at 20:00

## 2020-02-13 NOTE — PHYSICAL THERAPY INITIAL EVALUATION ADULT - LEVEL OF INDEPENDENCE: STAIR NEGOTIATION, REHAB EVAL
supervision/pt's HR up to 112 bpm after amb/steps, decreased to 87bpm with seated rest, pt also with min SON, SPO2 was 99% on RA

## 2020-02-13 NOTE — CONSULT NOTE ADULT - SUBJECTIVE AND OBJECTIVE BOX
NEPHROLOGY CONSULTATION NOTE    THIS CONSULT IS INCOMPLETE / FULL CONSULT TO FOLLOW    Patient is a 39y Male whom presented to the hospital with     PAST MEDICAL & SURGICAL HISTORY:  No pertinent past medical history  No significant past surgical history    Allergies:  No Known Allergies    Home Medications Reviewed  Hospital Medications:   MEDICATIONS  (STANDING):  ampicillin/sulbactam  IVPB 1.5 Gram(s) IV Intermittent every 6 hours  chlorhexidine 4% Liquid 1 Application(s) Topical <User Schedule>  enoxaparin Injectable 40 milliGRAM(s) SubCutaneous daily  gabapentin 300 milliGRAM(s) Oral three times a day  lidocaine   Patch 1 Patch Transdermal daily  methocarbamol 500 milliGRAM(s) Oral three times a day  predniSONE   Tablet 40 milliGRAM(s) Oral daily  sodium chloride 0.9%. 1000 milliLiter(s) (200 mL/Hr) IV Continuous <Continuous>      SOCIAL HISTORY:  Denies ETOH,Smoking,   FAMILY HISTORY:  No pertinent family history in first degree relatives        REVIEW OF SYSTEMS:  CONSTITUTIONAL: No weakness, fevers or chills  EYES/ENT: No visual changes;  No vertigo or throat pain   NECK: No pain or stiffness  RESPIRATORY: No cough, wheezing, hemoptysis; No shortness of breath  CARDIOVASCULAR: No chest pain or palpitations.  GASTROINTESTINAL: No abdominal or epigastric pain. No nausea, vomiting, or hematemesis; No diarrhea or constipation. No melena or hematochezia.  GENITOURINARY: No dysuria, frequency, foamy urine, urinary urgency, incontinence or hematuria  NEUROLOGICAL: No numbness or weakness  SKIN: No itching, burning, rashes, or lesions   VASCULAR: No bilateral lower extremity edema.   All other review of systems is negative unless indicated above.    VITALS:  T(F): 98.6 (20 @ 05:29), Max: 99.8 (20 @ 16:12)  HR: 81 (20 @ 05:29)  BP: 131/70 (20 @ 05:29)  RR: 18 (20 @ 05:29)  SpO2: 94% (20 @ 16:12)     @ 07:01  -   @ 07:00  --------------------------------------------------------  IN: 1200 mL / OUT: 1725 mL / NET: -525 mL      Height (cm): 172.7 ( @ 21:15)  Weight (kg): 116.5 ( @ 21:15)  BMI (kg/m2): 39.1 ( @ 21:15)  BSA (m2): 2.27 ( 21:15)      I&O's Detail    2020 07:01  -  2020 07:00  --------------------------------------------------------  IN:    sodium chloride 0.9%.: 1200 mL  Total IN: 1200 mL    OUT:    Voided: 1725 mL  Total OUT: 1725 mL    Total NET: -525 mL        Creatine Kinase, Serum: 97687 U/L (20 @ 05:48)  Creatine Kinase, Serum: 12344 U/L (20 @ 20:40)  Creatine Kinase, Serum: 01087 U/L (20 @ 11:52)      PHYSICAL EXAM:  Constitutional: NAD  HEENT: anicteric sclera, oropharynx clear, MMM  Neck: No JVD  Respiratory: CTAB, no wheezes, rales or rhonchi  Cardiovascular: S1, S2, RRR  Gastrointestinal: BS+, soft, NT/ND  Extremities: No cyanosis or clubbing. No peripheral edema  Neurological: A/O x 3, no focal deficits  Psychiatric: Normal mood, normal affect  : No CVA tenderness. No vega.   Skin: No rashes  Vascular Access:    LABS:      141  |  106  |  12  ----------------------------<  101<H>  3.5   |  23  |  0.9    Ca    8.5      2020 05:48  Mg     1.7         TPro  5.8<L>  /  Alb  3.4<L>  /  TBili  0.6  /  DBili      /  AST  361<H>  /  ALT  341<H>  /  AlkPhos  52  02-13    Creatinine Trend: 0.9 <--, 0.8 <--, 0.8 <--, 1.0 <--, 1.4 <--, 1.4 <--, 2.1 <--, 3.0 <--                        12.1   13.01 )-----------( 190      ( 2020 05:48 )             35.6     Urine Studies:  Urinalysis Basic - ( 10 Feb 2020 04:00 )    Color: Yellow / Appearance: Turbid / S.025 / pH:   Gluc:  / Ketone: Negative  / Bili: Negative / Urobili: <2 mg/dL   Blood:  / Protein: 100 mg/dL / Nitrite: Negative   Leuk Esterase: Negative / RBC: 32 /HPF / WBC 4 /HPF   Sq Epi:  / Non Sq Epi: 7 /HPF / Bacteria: 0.0                RADIOLOGY & ADDITIONAL STUDIES:

## 2020-02-13 NOTE — PHYSICAL THERAPY INITIAL EVALUATION ADULT - GENERAL OBSERVATIONS, REHAB EVAL
9:35-10:24 49 min  pt rec in bed in NAD, pt agreeable to PT, spouse at the b/s, pt c/o 5/10 back and L LE pain, received pain meds already as per pt

## 2020-02-13 NOTE — PROGRESS NOTE ADULT - ASSESSMENT
39 year old man with PMHx opioid abuse ( was in patient detox, last 4 weeks op detox), presenting after being found in his car unresponsive was in car for 4 to 6 h. He snorts heroin daily. Admitted to ICU for acute hypercapnic respiratory failure d/t Opioid overdose s/p Narcan Pt also demonstrated signs of BENNY d/t Rhabdo and new onset afib w/ rvr.    #Rhabdomyolysis  - CPK >20,000. Downtrending  - Continue with aggressive hydration    #Lumbar back pain  - MRI Lumbar w/w/o 2/12 showed mild edema and faint enhancement involving the right paraspinal muscles representative of myositis  - Per Neuro,      Gabapentin 300mg PO TID     Robaxin 750mg PO TID     Prednisone taper, 40mg 2/13, 30mg 2/14, 20mg 2/15, 10mg 2/16    #Acute Hypercapnic respiratory failure  #Suspected opioid overdose  - CXR with bibasilar opacities L>R. Improved  - Outpatient detox (reports that he called them already)    #BENNY - resolved    #Afib w/ RVR   - Converted back to sinus  - Likely due to hypoxia and acidosis  - CHADVASC 0    #Disposition: Discharge to rehab  #Diet: DASH/TLC  #GI Prophylaxis: Protonix 40mg PO Daily  #DVT Prophylaxis: Lovenox 40mg SubQ qHS  #Activity: Ambulate as tolerated  #Code Status: Full Code 39 year old man with PMHx opioid abuse ( was in patient detox, last 4 weeks op detox), presenting after being found in his car unresponsive was in car for 4 to 6 h. He snorts heroin daily. Admitted to ICU for acute hypercapnic respiratory failure d/t Opioid overdose s/p Narcan Pt also demonstrated signs of BENNY d/t Rhabdo and new onset afib w/ rvr.    #Rhabdomyolysis  - CPK >20,000. Downtrending  - Continue with aggressive hydration    #Lumbar back pain  - MRI Lumbar w/w/o 2/12 showed mild edema and faint enhancement involving the right paraspinal muscles representative of myositis  - Per Neuro,      Gabapentin 300mg PO TID     Robaxin 750mg PO TID     Prednisone taper, 40mg 2/13, 30mg 2/14, 20mg 2/15, 10mg 2/16    #Acute Hypercapnic respiratory failure  #Suspected opioid overdose  - CXR with bibasilar opacities L>R. Improved  - Outpatient detox (reports that he called them already)  - Continue Unasyn 1.5g IV q6H for suspected aspiration pneumonia    #BENNY - resolved    #Afib w/ RVR   - Converted back to sinus  - Likely due to hypoxia and acidosis  - CHADVASC 0    #Disposition: Discharge to rehab  #Diet: DASH/TLC  #GI Prophylaxis: Protonix 40mg PO Daily  #DVT Prophylaxis: Lovenox 40mg SubQ qHS  #Activity: Ambulate as tolerated  #Code Status: Full Code

## 2020-02-13 NOTE — PHYSICAL THERAPY INITIAL EVALUATION ADULT - AMBULATION SKILLS, REHAB EVAL
independent Bcc Infiltrative Histology Text: There were numerous aggregates of basaloid cells demonstrating an infiltrative pattern.

## 2020-02-13 NOTE — PROGRESS NOTE ADULT - ASSESSMENT
39 year old man with PMHx opiod abuse ( was in patient detox, last 4 weeks op detox), presenting after being found in his car unresponsive was in car for 4 to 6 h. He snorts heroin daily. He was given narcan 6mg by EMS upon arrival. He then became tachycardia to the 150s and was found to be in Afib with RVR.      1.	Heroin overdose  2.	Acute Hypercapnic respiratory failure  3.	PAF  4.	Rhabdomyolysis  5.	BENNY  6.	Aspiration PNA         PLAN:    · 39 year old man with PMHx opiod abuse ( was in patient detox, last 4 weeks op detox), presenting after being found in his car unresponsive was in car for 4 to 6 h. He snorts heroin daily. He was given narcan 6mg by EMS upon arrival. He then became tachycardia to the 150s and was found to be in Afib with RVR.      1.	Heroin overdose  2.	Acute Hypercapnic respiratory failure  3.	PAF  4.	Rhabdomyolysis  5.	BENNY  6.	Aspiration Pneumonitis  7.	Ac. Hypercapnic respiratory failure on admission  8.	Acidosis on admission  9.	Obesity / Likely SPARKLE         PLAN:    ·	No more opioid withdrawal symptoms.   ·	BiPAP at night and PRN  ·	Change IVF to 150 cc/hr.  ·	Check CK level and electrolytes in AM  ·	Replete Mg   ·	Blood cxs x 2 are negative  ·	Cont IV Unasyn today  ·	Cont his other home meds.

## 2020-02-13 NOTE — PROGRESS NOTE ADULT - ASSESSMENT
Assessment:   Patient is a 39 y o male w/ past substance use history of heroin use found unresponsive in car w/ presumed heroin overdose a/w severe LBP with hyperCkemia likely secondary to prolonged sedentay position with evidence of focal myoedema on neuroimaging likely from prolonged compression.      Plan:   - pain management evaluation  - continue current management  - continue PT  - avoid heroin, illicit drug use  - no further inpt neurologic w/u

## 2020-02-13 NOTE — PHYSICAL THERAPY INITIAL EVALUATION ADULT - PERTINENT HX OF CURRENT PROBLEM, REHAB EVAL
pt adm for opioid OD, BENNY, rhabdomyolysis, new onset Afib, respiratory acidosis, h/o LBP/injury, MRI showed R paraspinal myositis, pt reports herniated/bulging discs, heroine daily, s/p detox and relapse, found unresponsive in car, s/p Narcan

## 2020-02-13 NOTE — PROGRESS NOTE ADULT - SUBJECTIVE AND OBJECTIVE BOX
Patient Information:  APRIL LUIS / 39y / Male / MRN#:[id number]    Hospital Day: 3d    HPI:  39 year old man with PMHx opiod abuse ( was in patient detox, last 4 weeks op detox), presenting after being found in his car unresponsive was in car for 4 to 6 h. He snorts heroin daily. He was given narcan 6mg by EMS upon arrival. He then became tachycardia to the 150s and was found to be in Afib with RVR. Denied fever, chills, n/v/d/c, cp,  pleuritic cp, palpitations, cough, wheezing, hemoptysis, HA, blurry vision, dizziness, lightheadedness, numbness, tingling, neck pain, neck stiffness, abdominal pain, melena, BRBPR, hematuria, urinary incontinace, trauma, calf pain/swelling/erythema, sick contacts, recent travel or rash.    In ED, he was found to be in BENNY with a Cr of 3. High CK. He was also found to have respiratory acidosis on ABG with pH 7,1 and pCO2 62. He was given 1L NS and placed on BiPaP with improvement. CXR showed b/l infiltrates. WBC was 25. called for MICU. This AM more awake c/o mainly of back pain.    Interval History:  Patient seen and examined at bedside. No acute events overnight.    Past Medical History:  No pertinent past medical history    Past Surgical History:  No significant past surgical history    Allergies:  No Known Allergies    Medications:  PRN:    Standing:  ampicillin/sulbactam  IVPB 1.5 Gram(s) IV Intermittent every 6 hours  chlorhexidine 4% Liquid 1 Application(s) Topical <User Schedule>  enoxaparin Injectable 40 milliGRAM(s) SubCutaneous daily  gabapentin 300 milliGRAM(s) Oral three times a day  lidocaine   Patch 1 Patch Transdermal daily  methocarbamol 500 milliGRAM(s) Oral three times a day  predniSONE   Tablet 40 milliGRAM(s) Oral daily  sodium chloride 0.9%. 1000 milliLiter(s) (200 mL/Hr) IV Continuous <Continuous>    Home:    Vitals:  T(C): 37, Max: 37.7 (02-12-20 @ 16:12)  T(F): 98.6, Max: 99.8 (02-12-20 @ 16:12)  HR: 81 (81 - 99)  BP: 131/70 (131/70 - 147/89)  RR: 18 (18 - 18)  SpO2: 94% (94% - 94%)    Physical Exam:  General: Awake, Alert. Not in acute distress.  Heart: Regular rate and rhythm; S1, S2; No murmurs.  Lungs: Clear to auscultation bilaterally.  Abdomen: Soft, nontender, nondistended.  Extremities: No edema in upper or lower extremities.  Neuro: AAOx3, No focal deficits.    Labs:  CBC (02-13 @ 05:48)                        Hgb: 12.1   WBC: 13.01 )-----------------( Plts: 190                              Hct: 35.6     Chem (02-13 @ 05:48)  Na: 141  |     Cl: 106     |  BUN: 12  -----------------------------------------< Gluc: 101    K: 3.5   |    HCO3: 23  |  Cr: 0.9    Ca 8.5 (02-13 @ 05:48)  Mg 1.7 (02-13 @ 05:48)    LFTs (02-13 @ 05:48)  TPro 5.8  /  Alb 3.4  TBili 0.6  /  DBili       /    /  AlkPhos 52    Microbiology:  Culture - Blood (collected 02-11 @ 00:50)  Source: .Blood None  Preliminary Report (02-12 @ 07:01):    No growth to date.    Culture - Blood (collected 02-11 @ 00:50)  Source: .Blood None  Preliminary Report (02-12 @ 07:01):    No growth to date.    Culture - Blood (collected 02-10 @ 06:37)  Source: .Blood None  Preliminary Report (02-11 @ 14:01):    No growth to date.    Culture - Urine (collected 02-10 @ 04:00)  Source: .Urine Catheterized  Final Report (02-11 @ 08:53):    No growth

## 2020-02-13 NOTE — PROGRESS NOTE ADULT - SUBJECTIVE AND OBJECTIVE BOX
Neurology Progress Note    Interval History:  Pt currently stable, LBP improved.  No focal weakness or numbness in the legs.  no ambulatory problems.  Does not recall how long he was unresponsive.      HPI:  39 year old man with PMHx opiod abuse ( was in patient detox, last 4 weeks op detox), presenting after being found in his car unresponsive was in car for 4 to 6 h. He snorts heroin daily. He was given narcan 6mg by EMS upon arrival. He then became tachycardia to the 150s and was found to be in Afib with RVR. Denied fever, chills, n/v/d/c, cp,  pleuritic cp, palpitations, cough, wheezing, hemoptysis, HA, blurry vision, dizziness, lightheadedness, numbness, tingling, neck pain, neck stiffness, abdominal pain, melena, BRBPR, hematuria, urinary incontinace, trauma, calf pain/swelling/erythema, sick contacts, recent travel or rash.    In ED, he was found to be in BENNY with a Cr of 3. High CK. He was also found to have respiratory acidosis on ABG with pH 7,1 and pCO2 62. He was given 1L NS and placed on BiPaP with improvement. CXR showed b/l infiltrates. WBC was 25. called for MICU. This AM more awake c/o mainly of back pain. (10 Feb 2020 01:09)    PAST MEDICAL & SURGICAL HISTORY:  No pertinent past medical history  No significant past surgical history    Medications:  ampicillin/sulbactam  IVPB 1.5 Gram(s) IV Intermittent every 6 hours  chlorhexidine 4% Liquid 1 Application(s) Topical <User Schedule>  enoxaparin Injectable 40 milliGRAM(s) SubCutaneous daily  gabapentin 300 milliGRAM(s) Oral three times a day  lidocaine   Patch 1 Patch Transdermal daily  methocarbamol 500 milliGRAM(s) Oral three times a day  sodiu chloride 0.9%. 1000 milliLiter(s) IV Continuous <Continuous>    Vital Signs Last 24 Hrs  T(C): 37.2 (13 Feb 2020 12:32), Max: 37.7 (12 Feb 2020 16:12)  T(F): 99 (13 Feb 2020 12:32), Max: 99.8 (12 Feb 2020 16:12)  HR: 89 (13 Feb 2020 12:32) (81 - 99)  BP: 128/75 (13 Feb 2020 12:32) (128/75 - 147/89)  BP(mean): --  RR: 18 (13 Feb 2020 12:32) (18 - 18)  SpO2: 94% (12 Feb 2020 16:12) (94% - 94%)    Neurological Exam:   Mental status: Awake, alert and oriented x3.  Recent and remote memory intact.  Naming, repetition and comprehension intact.  Attention/concentration intact.  No dysarthria, no aphasia.  Fund of knowledge appropriate.    Cranial nerves: Pupils equally round and reactive to light, visual fields full, no nystagmus, extraocular muscles intact, V1 through V3 intact bilaterally and symmetric, face symmetric, hearing intact to finger rub, palate elevation symmetric, tongue was midline.  Motor:  MRC grading 5/5 b/l UE/LE.   strength 5/5.  Normal tone and bulk.  No abnormal movements.    Sensation: Intact to light touch, proprioception, and pinprick.   Coordination: No dysmetria on finger-to-nose and heel-to-shin.  No dysdiadokinesia.  Reflexes: 2+ in bilateral UE/LE, downgoing toes bilaterally. (-) Trammell.  Gait: Narrow and steady. No ataxia.  Romberg negative    Labs:  CBC Full  -  ( 13 Feb 2020 05:48 )  WBC Count : 13.01 K/uL  RBC Count : 3.92 M/uL  Hemoglobin : 12.1 g/dL  Hematocrit : 35.6 %  Platelet Count - Automated : 190 K/uL  Mean Cell Volume : 90.8 fL  Mean Cell Hemoglobin : 30.9 pg  Mean Cell Hemoglobin Concentration : 34.0 g/dL  Auto Neutrophil # : 8.68 K/uL  Auto Lymphocyte # : 2.79 K/uL  Auto Monocyte # : 1.39 K/uL  Auto Eosinophil # : 0.02 K/uL  Auto Basophil # : 0.05 K/uL  Auto Neutrophil % : 66.7 %  Auto Lymphocyte % : 21.4 %  Auto Monocyte % : 10.7 %  Auto Eosinophil % : 0.2 %  Auto Basophil % : 0.4 %    02-13    141  |  106  |  12  ----------------------------<  101<H>  3.5   |  23  |  0.9    Ca    8.5      13 Feb 2020 05:48  Mg     1.7     02-13    TPro  5.8<L>  /  Alb  3.4<L>  /  TBili  0.6  /  DBili  x   /  AST  361<H>  /  ALT  341<H>  /  AlkPhos  52  02-13    LIVER FUNCTIONS - ( 13 Feb 2020 05:48 )  Alb: 3.4 g/dL / Pro: 5.8 g/dL / ALK PHOS: 52 U/L / ALT: 341 U/L / AST: 361 U/L / GGT: x           Creatine Kinase, Serum in AM (02.13.20 @ 05:48)    Creatine Kinase, Serum: 89455 U/L    < from: MR Lumbar Spine w/wo IV Cont (02.12.20 @ 14:27) >  IMPRESSION:    Mild edema and mild faint enhancement involving the right paraspinal muscles which represents myositis.    RYAN DELGADO M.D., ATTENDING RADIOLOGIST  This document has been electronically signed. Feb 12 2020  3:13PM    < end of copied text >

## 2020-02-13 NOTE — PROGRESS NOTE ADULT - SUBJECTIVE AND OBJECTIVE BOX
LOVELY LUISALE  39y Male    CHIEF COMPLAINT:    Patient is a 39y old  Male who presents with a chief complaint of opiate overdose (2020 15:56)      INTERVAL HPI/OVERNIGHT EVENTS:    Patient seen and examined.    ROS: All other systems are negative.    Vital Signs:    T(F): 98.6 (20 @ 05:29), Max: 99.8 (20 @ 16:12)  HR: 81 (20 @ 05:29) (81 - 99)  BP: 131/70 (20 @ 05:29) (119/74 - 147/89)  RR: 18 (20 @ 05:29) (18 - 18)  SpO2: 94% (20 @ 16:12) (94% - 97%)  I&O's Summary    2020 07:01  -  2020 07:00  --------------------------------------------------------  IN: 1000 mL / OUT: 1725 mL / NET: -725 mL      Daily Height in cm: 172.72 (2020 21:15)    Daily Weight in k.2 (2020 05:29)  CAPILLARY BLOOD GLUCOSE          PHYSICAL EXAM:    GENERAL:  NAD  SKIN: No rashes or lesions  HENT: Atrumatic. Normocephalic. PERRL. Moist membranes.  NECK: Supple, No JVD. No lymphadenopathy.  PULMONARY: CTA B/L. No wheezing. No rales  CVS: Normal S1, S2. Rate and Rythm are regular. No murmurs.  ABDOMEN/GI: Soft, Nontender, Nondistended; BS present  EXTREMITIES: Peripheral pulses intact. No edema B/L LE.  NEUROLOGIC:  No motor or sensory deficit.  PSYCH: Alert & oriented x 3    Consultant(s) Notes Reviewed:  [x ] YES  [ ] NO  Care Discussed with Consultants/Other Providers [ x] YES  [ ] NO    EKG reviewed  Telemetry reviewed    LABS:                        12.1   13.01 )-----------( 190      ( 2020 05:48 )             35.6         141  |  106  |  12  ----------------------------<  101<H>  3.5   |  23  |  0.9    Ca    8.5      2020 05:48  Mg     1.7         TPro  5.8<L>  /  Alb  3.4<L>  /  TBili  0.6  /  DBili  x   /  AST  361<H>  /  ALT  341<H>  /  AlkPhos  52          Trop --, [place] --[place] [address], 20 @ 05:48  [place] --, [place] [address], 20 @ 20:40  [place] --, [place] [address], 20 @ 11:52  Trop --, CKMB --, CK >34[address], 20 @ 16:22  [place] --, [place] [address], 20 @ 11:25  [place] --, [place] [address], 20 @ 00:50  [place] --, [place] [address], 02-10-20 @ 20:38  [place] <0.01, [place] [address], 02-10-20 @ 16:36  [place] --, [place] [address], 02-10-20 @ 12:00      Culture - Blood (collected 2020 00:50)  Source: .Blood None  Preliminary Report (2020 07:01):    No growth to date.    Culture - Blood (collected 2020 00:50)  Source: .Blood None  Preliminary Report (2020 07:01):    No growth to date.        RADIOLOGY & ADDITIONAL TESTS:      Imaging or report Personally Reviewed:  [ ] YES  [ ] NO    Medications:  Standing  ampicillin/sulbactam  IVPB 1.5 Gram(s) IV Intermittent every 6 hours  chlorhexidine 4% Liquid 1 Application(s) Topical <User Schedule>  enoxaparin Injectable 40 milliGRAM(s) SubCutaneous daily  gabapentin 300 milliGRAM(s) Oral three times a day  lidocaine   Patch 1 Patch Transdermal daily  methocarbamol 500 milliGRAM(s) Oral three times a day  predniSONE   Tablet 40 milliGRAM(s) Oral daily  sodium chloride 0.9%. 1000 milliLiter(s) IV Continuous <Continuous>    PRN Meds      Case discussed with resident    Care discussed with pt/family LOVELY LUISALE  39y Male    CHIEF COMPLAINT:    Patient is a 39y old  Male who presents with a chief complaint of opiate overdose (2020 15:56)      INTERVAL HPI/OVERNIGHT EVENTS:    Patient seen and examined. Feels good. No sob. On IVF for rhabdomyolysis. No opiate withdrawal symptoms    ROS: All other systems are negative.    Vital Signs:    T(F): 98.6 (20 @ 05:29), Max: 99.8 (20 @ 16:12)  HR: 81 (20 @ 05:29) (81 - 99)  BP: 131/70 (20 @ 05:29) (119/74 - 147/89)  RR: 18 (20 @ 05:29) (18 - 18)  SpO2: 94% (20 @ 16:12) (94% - 97%)  I&O's Summary    2020 07:01  -  2020 07:00  --------------------------------------------------------  IN: 1000 mL / OUT: 1725 mL / NET: -725 mL      Daily Height in cm: 172.72 (2020 21:15)    Daily Weight in k.2 (2020 05:29)  CAPILLARY BLOOD GLUCOSE          PHYSICAL EXAM:    GENERAL:  NAD  SKIN: No rashes or lesions  HENT: Atraumatic Normocephalic. PERRL. Moist membranes.  NECK: Supple, No JVD. No lymphadenopathy.  PULMONARY: CTA B/L. No wheezing. No rales  CVS: Normal S1, S2. Rate and Rhythm are regular. No murmurs.  ABDOMEN/GI: Soft, Nontender, Nondistended; BS present  EXTREMITIES: Peripheral pulses intact. No edema B/L LE.  NEUROLOGIC:  No motor or sensory deficit.  PSYCH: Alert & oriented x 3    Consultant(s) Notes Reviewed:  [x ] YES  [ ] NO  Care Discussed with Consultants/Other Providers [ x] YES  [ ] NO    EKG reviewed  Telemetry reviewed    LABS:                        12.1   13.01 )-----------( 190      ( 2020 05:48 )             35.6         141  |  106  |  12  ----------------------------<  101<H>  3.5   |  23  |  0.9    Ca    8.5      2020 05:48  Mg     1.7         TPro  5.8<L>  /  Alb  3.4<L>  /  TBili  0.6  /  DBili  x   /  AST  361<H>  /  ALT  341<H>  /  AlkPhos  52          Trop --, [place] --[place] [address], 20 @ 05:48  [place] --, [place] [address], 20 @ 20:40  [place] --, [place] [address], 20 @ 11:52  Trop --, CKMB --, CK >787[address], 20 @ 16:22  [place] --, [place] [address], 20 @ 11:25  [place] --, [place] [address], 20 @ 00:50  [place] --, [place] [address], 02-10-20 @ 20:38  T[place] <0.01, [place] --[place] [address], 02-10-20 @ 16:36  [place] --, [place] [address], 02-10-20 @ 12:00      Culture - Blood (collected 2020 00:50)  Source: .Blood None  Preliminary Report (2020 07:01):    No growth to date.    Culture - Blood (collected 2020 00:50)  Source: .Blood None  Preliminary Report (2020 07:01):    No growth to date.        RADIOLOGY & ADDITIONAL TESTS:      Imaging or report Personally Reviewed:  [ ] YES  [ ] NO    Medications:  Standing  ampicillin/sulbactam  IVPB 1.5 Gram(s) IV Intermittent every 6 hours  chlorhexidine 4% Liquid 1 Application(s) Topical <User Schedule>  enoxaparin Injectable 40 milliGRAM(s) SubCutaneous daily  gabapentin 300 milliGRAM(s) Oral three times a day  lidocaine   Patch 1 Patch Transdermal daily  methocarbamol 500 milliGRAM(s) Oral three times a day  predniSONE   Tablet 40 milliGRAM(s) Oral daily  sodium chloride 0.9%. 1000 milliLiter(s) IV Continuous <Continuous>    PRN Meds      Case discussed with resident    Care discussed with pt/family

## 2020-02-14 LAB
ALBUMIN SERPL ELPH-MCNC: 3.7 G/DL — SIGNIFICANT CHANGE UP (ref 3.5–5.2)
ALP SERPL-CCNC: 54 U/L — SIGNIFICANT CHANGE UP (ref 30–115)
ALT FLD-CCNC: 276 U/L — HIGH (ref 0–41)
ANION GAP SERPL CALC-SCNC: 15 MMOL/L — HIGH (ref 7–14)
AST SERPL-CCNC: 217 U/L — HIGH (ref 0–41)
BASOPHILS # BLD AUTO: 0.07 K/UL — SIGNIFICANT CHANGE UP (ref 0–0.2)
BASOPHILS NFR BLD AUTO: 0.5 % — SIGNIFICANT CHANGE UP (ref 0–1)
BILIRUB SERPL-MCNC: 0.6 MG/DL — SIGNIFICANT CHANGE UP (ref 0.2–1.2)
BUN SERPL-MCNC: 14 MG/DL — SIGNIFICANT CHANGE UP (ref 10–20)
CALCIUM SERPL-MCNC: 9.1 MG/DL — SIGNIFICANT CHANGE UP (ref 8.5–10.1)
CHLORIDE SERPL-SCNC: 103 MMOL/L — SIGNIFICANT CHANGE UP (ref 98–110)
CK SERPL-CCNC: 9656 U/L — HIGH (ref 0–225)
CO2 SERPL-SCNC: 23 MMOL/L — SIGNIFICANT CHANGE UP (ref 17–32)
CREAT SERPL-MCNC: 0.8 MG/DL — SIGNIFICANT CHANGE UP (ref 0.7–1.5)
DRUG SCREEN, SERUM: SIGNIFICANT CHANGE UP
EOSINOPHIL # BLD AUTO: 0.05 K/UL — SIGNIFICANT CHANGE UP (ref 0–0.7)
EOSINOPHIL NFR BLD AUTO: 0.3 % — SIGNIFICANT CHANGE UP (ref 0–8)
GLUCOSE SERPL-MCNC: 100 MG/DL — HIGH (ref 70–99)
HCT VFR BLD CALC: 39.2 % — LOW (ref 42–52)
HGB BLD-MCNC: 13.7 G/DL — LOW (ref 14–18)
IMM GRANULOCYTES NFR BLD AUTO: 1 % — HIGH (ref 0.1–0.3)
LACTATE SERPL-SCNC: 0.9 MMOL/L — SIGNIFICANT CHANGE UP (ref 0.7–2)
LYMPHOCYTES # BLD AUTO: 18.8 % — LOW (ref 20.5–51.1)
LYMPHOCYTES # BLD AUTO: 2.76 K/UL — SIGNIFICANT CHANGE UP (ref 1.2–3.4)
MAGNESIUM SERPL-MCNC: 1.7 MG/DL — LOW (ref 1.8–2.4)
MCHC RBC-ENTMCNC: 31.6 PG — HIGH (ref 27–31)
MCHC RBC-ENTMCNC: 34.9 G/DL — SIGNIFICANT CHANGE UP (ref 32–37)
MCV RBC AUTO: 90.5 FL — SIGNIFICANT CHANGE UP (ref 80–94)
MONOCYTES # BLD AUTO: 1.48 K/UL — HIGH (ref 0.1–0.6)
MONOCYTES NFR BLD AUTO: 10.1 % — HIGH (ref 1.7–9.3)
NEUTROPHILS # BLD AUTO: 10.18 K/UL — HIGH (ref 1.4–6.5)
NEUTROPHILS NFR BLD AUTO: 69.3 % — SIGNIFICANT CHANGE UP (ref 42.2–75.2)
NRBC # BLD: 0 /100 WBCS — SIGNIFICANT CHANGE UP (ref 0–0)
PLATELET # BLD AUTO: 216 K/UL — SIGNIFICANT CHANGE UP (ref 130–400)
POTASSIUM SERPL-MCNC: 3.7 MMOL/L — SIGNIFICANT CHANGE UP (ref 3.5–5)
POTASSIUM SERPL-SCNC: 3.7 MMOL/L — SIGNIFICANT CHANGE UP (ref 3.5–5)
PROT SERPL-MCNC: 6.3 G/DL — SIGNIFICANT CHANGE UP (ref 6–8)
RBC # BLD: 4.33 M/UL — LOW (ref 4.7–6.1)
RBC # FLD: 12.5 % — SIGNIFICANT CHANGE UP (ref 11.5–14.5)
SODIUM SERPL-SCNC: 141 MMOL/L — SIGNIFICANT CHANGE UP (ref 135–146)
WBC # BLD: 14.69 K/UL — HIGH (ref 4.8–10.8)
WBC # FLD AUTO: 14.69 K/UL — HIGH (ref 4.8–10.8)

## 2020-02-14 PROCEDURE — 99233 SBSQ HOSP IP/OBS HIGH 50: CPT

## 2020-02-14 RX ORDER — MAGNESIUM SULFATE 500 MG/ML
2 VIAL (ML) INJECTION ONCE
Refills: 0 | Status: COMPLETED | OUTPATIENT
Start: 2020-02-14 | End: 2020-02-14

## 2020-02-14 RX ADMIN — GABAPENTIN 300 MILLIGRAM(S): 400 CAPSULE ORAL at 22:15

## 2020-02-14 RX ADMIN — LIDOCAINE 1 PATCH: 4 CREAM TOPICAL at 22:50

## 2020-02-14 RX ADMIN — AMPICILLIN SODIUM AND SULBACTAM SODIUM 100 GRAM(S): 250; 125 INJECTION, POWDER, FOR SUSPENSION INTRAMUSCULAR; INTRAVENOUS at 18:41

## 2020-02-14 RX ADMIN — SODIUM CHLORIDE 150 MILLILITER(S): 9 INJECTION INTRAMUSCULAR; INTRAVENOUS; SUBCUTANEOUS at 22:14

## 2020-02-14 RX ADMIN — Medication 30 MILLIGRAM(S): at 05:58

## 2020-02-14 RX ADMIN — AMPICILLIN SODIUM AND SULBACTAM SODIUM 100 GRAM(S): 250; 125 INJECTION, POWDER, FOR SUSPENSION INTRAMUSCULAR; INTRAVENOUS at 23:04

## 2020-02-14 RX ADMIN — SODIUM CHLORIDE 150 MILLILITER(S): 9 INJECTION INTRAMUSCULAR; INTRAVENOUS; SUBCUTANEOUS at 03:40

## 2020-02-14 RX ADMIN — LIDOCAINE 1 PATCH: 4 CREAM TOPICAL at 11:37

## 2020-02-14 RX ADMIN — AMPICILLIN SODIUM AND SULBACTAM SODIUM 100 GRAM(S): 250; 125 INJECTION, POWDER, FOR SUSPENSION INTRAMUSCULAR; INTRAVENOUS at 05:57

## 2020-02-14 RX ADMIN — GABAPENTIN 300 MILLIGRAM(S): 400 CAPSULE ORAL at 13:34

## 2020-02-14 RX ADMIN — Medication 50 GRAM(S): at 16:50

## 2020-02-14 RX ADMIN — LIDOCAINE 1 PATCH: 4 CREAM TOPICAL at 03:30

## 2020-02-14 RX ADMIN — METHOCARBAMOL 500 MILLIGRAM(S): 500 TABLET, FILM COATED ORAL at 22:14

## 2020-02-14 RX ADMIN — GABAPENTIN 300 MILLIGRAM(S): 400 CAPSULE ORAL at 05:57

## 2020-02-14 RX ADMIN — METHOCARBAMOL 500 MILLIGRAM(S): 500 TABLET, FILM COATED ORAL at 05:57

## 2020-02-14 RX ADMIN — SODIUM CHLORIDE 150 MILLILITER(S): 9 INJECTION INTRAMUSCULAR; INTRAVENOUS; SUBCUTANEOUS at 19:40

## 2020-02-14 RX ADMIN — AMPICILLIN SODIUM AND SULBACTAM SODIUM 100 GRAM(S): 250; 125 INJECTION, POWDER, FOR SUSPENSION INTRAMUSCULAR; INTRAVENOUS at 11:33

## 2020-02-14 RX ADMIN — AMPICILLIN SODIUM AND SULBACTAM SODIUM 100 GRAM(S): 250; 125 INJECTION, POWDER, FOR SUSPENSION INTRAMUSCULAR; INTRAVENOUS at 00:51

## 2020-02-14 RX ADMIN — METHOCARBAMOL 500 MILLIGRAM(S): 500 TABLET, FILM COATED ORAL at 13:34

## 2020-02-14 NOTE — PROGRESS NOTE ADULT - ASSESSMENT
39 year old man with PMHx opioid abuse ( was in patient detox, last 4 weeks op detox), presenting after being found in his car unresponsive was in car for 4 to 6 h. He snorts heroin daily. Admitted to ICU for acute hypercapnic respiratory failure d/t Opioid overdose s/p Narcan Pt also demonstrated signs of BENNY d/t Rhabdo and new onset afib w/ rvr.    #Rhabdomyolysis  - CPK >20,000, CK ~10k this morning, down from ~16k yesterday  - Continue NS @150cc/hr  - Follow up AM CK    #Lumbar back pain  - MRI Lumbar w/w/o 2/12 showed mild edema and faint enhancement involving the right paraspinal muscles representative of myositis  - Per Neuro,      Gabapentin 300mg PO TID     Robaxin 750mg PO TID     Prednisone taper, 40mg 2/13, 30mg 2/14, 20mg 2/15, 10mg 2/16    #Acute Hypercapnic respiratory failure  #Suspected opioid overdose  - CXR with bibasilar opacities L>R. Improved  - Outpatient detox (reports that he called them already)  - Continue Unasyn 1.5g IV q6H for suspected aspiration pneumonia    #BENNY - resolved    #Afib w/ RVR   - Converted back to sinus  - Likely due to hypoxia and acidosis  - CHADVASC 0    #Diet: DASH/TLC  #GI Prophylaxis: Protonix 40mg PO Daily  #DVT Prophylaxis: Lovenox 40mg SubQ qHS  #Activity: Ambulate as tolerated  #Code Status: Full Code

## 2020-02-14 NOTE — PROGRESS NOTE ADULT - SUBJECTIVE AND OBJECTIVE BOX
APRIL LUIS  39y Male    CHIEF COMPLAINT:    Patient is a 39y old  Male who presents with a chief complaint of opiate overdose (2020 14:42)      INTERVAL HPI/OVERNIGHT EVENTS:    Patient seen and examined. No sob. No diarrhea. No opoid withdrawal symptoms. Still on IVF for rhabdomyolysis. No cough or fever    ROS: All other systems are negative.    Vital Signs:    T(F): 98.7 (20 @ 05:15), Max: 99 (20 @ 12:32)  HR: 87 (20 @ 05:15) (87 - 92)  BP: 124/60 (20 @ 05:15) (124/60 - 135/76)  RR: 18 (20 @ 05:15) (18 - 18)  SpO2: --  I&O's Summary    2020 07:01  -  2020 07:00  --------------------------------------------------------  IN: 2240 mL / OUT: 1465 mL / NET: 775 mL    2020 07:01  -  2020 11:18  --------------------------------------------------------  IN: 420 mL / OUT: 900 mL / NET: -480 mL      Daily     Daily Weight in k.9 (2020 05:15)  CAPILLARY BLOOD GLUCOSE          PHYSICAL EXAM:    GENERAL:  NAD  SKIN: No rashes or lesions  HENT: Atraumatic Normocephalic. PERRL. Moist membranes.  NECK: Supple, No JVD. No lymphadenopathy.  PULMONARY: CTA B/L. No wheezing. No rales  CVS: Normal S1, S2. Rate and Rhythm are regular. No murmurs.  ABDOMEN/GI: Soft, Nontender, Nondistended; BS present  EXTREMITIES: Peripheral pulses intact. No edema B/L LE.  NEUROLOGIC:  No motor or sensory deficit.  PSYCH: Alert & oriented x 3    Consultant(s) Notes Reviewed:  [x ] YES  [ ] NO  Care Discussed with Consultants/Other Providers [ x] YES  [ ] NO    EKG reviewed  Telemetry reviewed    LABS:                        13.7   14.69 )-----------( 216      ( 2020 06:28 )             39.2         141  |  103  |  14  ----------------------------<  100<H>  3.7   |  23  |  0.8    Ca    9.1      2020 06:28  Mg     1.7         TPro  6.3  /  Alb  3.7  /  TBili  0.6  /  DBili  x   /  AST  217<H>  /  ALT  276<H>  /  AlkPhos  54          Trop --, CKMB --, [place] [address], 20 @ 06:28  [place] --, [place] [address], 20 @ 05:48  [place] --, [place] [address], 20 @ 20:40  [place] --, [place] [address], 20 @ 11:52  Trop --, CKMB --, CK >364[address], 20 @ 16:22  [place] --, [place] [address], 20 @ 11:25        RADIOLOGY & ADDITIONAL TESTS:      Imaging or report Personally Reviewed:  [ ] YES  [ ] NO    Medications:  Standing  ampicillin/sulbactam  IVPB 1.5 Gram(s) IV Intermittent every 6 hours  chlorhexidine 4% Liquid 1 Application(s) Topical <User Schedule>  enoxaparin Injectable 40 milliGRAM(s) SubCutaneous daily  gabapentin 300 milliGRAM(s) Oral three times a day  lidocaine   Patch 1 Patch Transdermal daily  magnesium sulfate  IVPB 2 Gram(s) IV Intermittent once  methocarbamol 500 milliGRAM(s) Oral three times a day  sodium chloride 0.9%. 1000 milliLiter(s) IV Continuous <Continuous>    PRN Meds      Case discussed with resident    Care discussed with pt/family

## 2020-02-14 NOTE — PROGRESS NOTE ADULT - SUBJECTIVE AND OBJECTIVE BOX
Patient Information:  APRIL LUIS / 39y / Male / MRN#:[id number]    Hospital Day: 4d    HPI:  39 year old man with PMHx opiod abuse ( was in patient detox, last 4 weeks op detox), presenting after being found in his car unresponsive was in car for 4 to 6 h. He snorts heroin daily. He was given narcan 6mg by EMS upon arrival. He then became tachycardia to the 150s and was found to be in Afib with RVR. Denied fever, chills, n/v/d/c, cp,  pleuritic cp, palpitations, cough, wheezing, hemoptysis, HA, blurry vision, dizziness, lightheadedness, numbness, tingling, neck pain, neck stiffness, abdominal pain, melena, BRBPR, hematuria, urinary incontinace, trauma, calf pain/swelling/erythema, sick contacts, recent travel or rash.    In ED, he was found to be in BENNY with a Cr of 3. High CK. He was also found to have respiratory acidosis on ABG with pH 7,1 and pCO2 62. He was given 1L NS and placed on BiPaP with improvement. CXR showed b/l infiltrates. WBC was 25. called for MICU. This AM more awake c/o mainly of back pain.    Interval History:  Patient seen and examined at bedside. No acute events overnight. CK ~10k today from ~16k yesterday, continue NS @150, f/u AM CK.    Past Medical History:  No pertinent past medical history    Past Surgical History:  No significant past surgical history    Allergies:  No Known Allergies    Medications:  PRN:    Standing:  ampicillin/sulbactam  IVPB 1.5 Gram(s) IV Intermittent every 6 hours  chlorhexidine 4% Liquid 1 Application(s) Topical <User Schedule>  enoxaparin Injectable 40 milliGRAM(s) SubCutaneous daily  gabapentin 300 milliGRAM(s) Oral three times a day  lidocaine   Patch 1 Patch Transdermal daily  magnesium sulfate  IVPB 2 Gram(s) IV Intermittent once  methocarbamol 500 milliGRAM(s) Oral three times a day  sodium chloride 0.9%. 1000 milliLiter(s) (150 mL/Hr) IV Continuous <Continuous>    Home:    Vitals:  T(C): 37.1, Max: 37.2 (02-13-20 @ 21:49)  T(F): 98.7, Max: 99 (02-13-20 @ 21:49)  HR: 87 (87 - 92)  BP: 124/60 (124/60 - 135/76)  RR: 18 (18 - 18)  SpO2: --    Physical Exam:  General: Awake, Alert. Not in acute distress.  Heart: Regular rate and rhythm; S1, S2; No murmurs.  Lungs: Clear to auscultation bilaterally.  Abdomen: Soft, nontender, nondistended.  Extremities: No edema in upper or lower extremities.  Neuro: AAOx3, No focal deficits.    Labs:  CBC (02-14 @ 06:28)                        Hgb: 13.7   WBC: 14.69 )-----------------( Plts: 216                              Hct: 39.2     Chem (02-14 @ 06:28)  Na: 141  |     Cl: 103     |  BUN: 14  -----------------------------------------< Gluc: 100    K: 3.7   |    HCO3: 23  |  Cr: 0.8    Ca 9.1 (02-14 @ 06:28)  Mg 1.7 (02-14 @ 06:28)    LFTs (02-14 @ 06:28)  TPro 6.3  /  Alb 3.7  TBili 0.6  /  DBili       /    /  AlkPhos 54    Microbiology:  Culture - Blood (collected 02-11 @ 00:50)  Source: .Blood None  Preliminary Report (02-12 @ 07:01):    No growth to date.    Culture - Blood (collected 02-11 @ 00:50)  Source: .Blood None  Preliminary Report (02-12 @ 07:01):    No growth to date.    Culture - Blood (collected 02-10 @ 06:37)  Source: .Blood None  Preliminary Report (02-11 @ 14:01):    No growth to date.    Culture - Urine (collected 02-10 @ 04:00)  Source: .Urine Catheterized  Final Report (02-11 @ 08:53):    No growth

## 2020-02-14 NOTE — DISCHARGE NOTE PROVIDER - NSDCCPCAREPLAN_GEN_ALL_CORE_FT
PRINCIPAL DISCHARGE DIAGNOSIS  Diagnosis: Opiate overdose  Assessment and Plan of Treatment: You were admitted to the hospital for suspected opiate overdose. You went into hypoxic respiratory failure and were managed in the ICU. Please refrain from illicit drug use in the future and follow up with a primary physician.      SECONDARY DISCHARGE DIAGNOSES  Diagnosis: Rhabdomyolysis  Assessment and Plan of Treatment: Your CK levels were significantly elevated as a result of muscle damage. You were treated with aggressive IV fluids and your levels improved.

## 2020-02-14 NOTE — DISCHARGE NOTE PROVIDER - HOSPITAL COURSE
39 year old man with PMHx opioid abuse ( was in patient detox, last 4 weeks op detox), presenting after being found in his car unresponsive was in car for 4 to 6 h.         He snorts heroin daily. He was given narcan 6mg by EMS upon arrival. He then became tachycardia to the 150s and was found to be in Afib with RVR. Denied fever, chills, n/v/d/c, cp,  pleuritic cp, palpitations, cough, wheezing, hemoptysis, HA, blurry vision, dizziness, lightheadedness, numbness, tingling, neck pain, neck stiffness, abdominal pain, melena, BRBPR, hematuria, urinary incontinence, trauma, calf pain/swelling/erythema, sick contacts, recent travel or rash.        In ED, he was found to be in BENNY with a Cr of 3. High CK. He was also found to have respiratory acidosis on ABG with pH 7,1 and pCO2 62. He was given 1L NS and placed on BIPAP with improvement. CXR showed b/l infiltrates. WBC was 25. called for MICU. This AM more awake c/o mainly of back pain.        #Rhabdomyolysis    - CK >20,000    - Treated with aggressive hydration        #Lumbar back pain    - MRI Lumbar w/w/o 2/12 showed mild edema and faint enhancement involving the right paraspinal muscles representative of myositis    - Evaluated by Neurology, recommended:       Gabapentin 300mg PO TID       Robaxin 750mg PO TID       Prednisone taper, 40mg 2/13, 30mg 2/14, 20mg 2/15, 10mg 2/16        #Acute Hypercapnic respiratory failure    #Suspected opioid overdose    - CXR with bibasilar opacities L>R    - Unasyn 1.5g IV q6H for suspected aspiration pneumonia    - Outpatient detox (reports that he called them already)        Patient had Afib w/ RVR, likely due to hypoxia and acidosis.    Converted back to sinus rhythm.

## 2020-02-14 NOTE — PROGRESS NOTE ADULT - ASSESSMENT
39 year old man with PMHx opiod abuse ( was in patient detox, last 4 weeks op detox), presenting after being found in his car unresponsive was in car for 4 to 6 h. He snorts heroin daily. He was given narcan 6mg by EMS upon arrival. He then became tachycardia to the 150s and was found to be in Afib with RVR.      1.	Heroin overdose  2.	Acute Hypercapnic respiratory failure  3.	PAF  4.	Rhabdomyolysis  5.	BENNY  6.	Aspiration Pneumonitis  7.	Ac. Hypercapnic respiratory failure on admission  8.	Acidosis on admission  9.	Obesity / Likely SPARKLE         PLAN:    ·	CK trending down. Cont IVF today. Check CK level in AM  ·	No more opioid withdrawal symptoms.   ·	BiPAP at night and PRN  ·	Check CK level and electrolytes in AM  ·	Replete Mg   ·	Blood cxs x 2 are negative  ·	Cont IV Unasyn today. Will D/C tomorrow.   ·	Cont his other home meds.     Progress note Handoff:    Pending: Elevated CK level. Still on IVF  Disposition: Home.

## 2020-02-15 VITALS
SYSTOLIC BLOOD PRESSURE: 154 MMHG | TEMPERATURE: 98 F | DIASTOLIC BLOOD PRESSURE: 80 MMHG | HEART RATE: 71 BPM | RESPIRATION RATE: 18 BRPM | WEIGHT: 257.94 LBS

## 2020-02-15 LAB
ALBUMIN SERPL ELPH-MCNC: 3.5 G/DL — SIGNIFICANT CHANGE UP (ref 3.5–5.2)
ALP SERPL-CCNC: 52 U/L — SIGNIFICANT CHANGE UP (ref 30–115)
ALT FLD-CCNC: 207 U/L — HIGH (ref 0–41)
ANION GAP SERPL CALC-SCNC: 14 MMOL/L — SIGNIFICANT CHANGE UP (ref 7–14)
AST SERPL-CCNC: 125 U/L — HIGH (ref 0–41)
BASOPHILS # BLD AUTO: 0.06 K/UL — SIGNIFICANT CHANGE UP (ref 0–0.2)
BASOPHILS NFR BLD AUTO: 0.4 % — SIGNIFICANT CHANGE UP (ref 0–1)
BILIRUB SERPL-MCNC: 0.4 MG/DL — SIGNIFICANT CHANGE UP (ref 0.2–1.2)
BUN SERPL-MCNC: 12 MG/DL — SIGNIFICANT CHANGE UP (ref 10–20)
CALCIUM SERPL-MCNC: 8.7 MG/DL — SIGNIFICANT CHANGE UP (ref 8.5–10.1)
CHLORIDE SERPL-SCNC: 103 MMOL/L — SIGNIFICANT CHANGE UP (ref 98–110)
CK SERPL-CCNC: 5443 U/L — HIGH (ref 0–225)
CO2 SERPL-SCNC: 23 MMOL/L — SIGNIFICANT CHANGE UP (ref 17–32)
CREAT SERPL-MCNC: 0.8 MG/DL — SIGNIFICANT CHANGE UP (ref 0.7–1.5)
CULTURE RESULTS: SIGNIFICANT CHANGE UP
EOSINOPHIL # BLD AUTO: 0.21 K/UL — SIGNIFICANT CHANGE UP (ref 0–0.7)
EOSINOPHIL NFR BLD AUTO: 1.5 % — SIGNIFICANT CHANGE UP (ref 0–8)
GLUCOSE SERPL-MCNC: 94 MG/DL — SIGNIFICANT CHANGE UP (ref 70–99)
HCT VFR BLD CALC: 40.3 % — LOW (ref 42–52)
HGB BLD-MCNC: 13.9 G/DL — LOW (ref 14–18)
IMM GRANULOCYTES NFR BLD AUTO: 0.9 % — HIGH (ref 0.1–0.3)
LYMPHOCYTES # BLD AUTO: 22.9 % — SIGNIFICANT CHANGE UP (ref 20.5–51.1)
LYMPHOCYTES # BLD AUTO: 3.31 K/UL — SIGNIFICANT CHANGE UP (ref 1.2–3.4)
MAGNESIUM SERPL-MCNC: 1.9 MG/DL — SIGNIFICANT CHANGE UP (ref 1.8–2.4)
MCHC RBC-ENTMCNC: 31.2 PG — HIGH (ref 27–31)
MCHC RBC-ENTMCNC: 34.5 G/DL — SIGNIFICANT CHANGE UP (ref 32–37)
MCV RBC AUTO: 90.6 FL — SIGNIFICANT CHANGE UP (ref 80–94)
MONOCYTES # BLD AUTO: 1.26 K/UL — HIGH (ref 0.1–0.6)
MONOCYTES NFR BLD AUTO: 8.7 % — SIGNIFICANT CHANGE UP (ref 1.7–9.3)
NEUTROPHILS # BLD AUTO: 9.46 K/UL — HIGH (ref 1.4–6.5)
NEUTROPHILS NFR BLD AUTO: 65.6 % — SIGNIFICANT CHANGE UP (ref 42.2–75.2)
NRBC # BLD: 0 /100 WBCS — SIGNIFICANT CHANGE UP (ref 0–0)
PLATELET # BLD AUTO: 253 K/UL — SIGNIFICANT CHANGE UP (ref 130–400)
POTASSIUM SERPL-MCNC: 3.7 MMOL/L — SIGNIFICANT CHANGE UP (ref 3.5–5)
POTASSIUM SERPL-SCNC: 3.7 MMOL/L — SIGNIFICANT CHANGE UP (ref 3.5–5)
PROT SERPL-MCNC: 6.1 G/DL — SIGNIFICANT CHANGE UP (ref 6–8)
RBC # BLD: 4.45 M/UL — LOW (ref 4.7–6.1)
RBC # FLD: 12.7 % — SIGNIFICANT CHANGE UP (ref 11.5–14.5)
SODIUM SERPL-SCNC: 140 MMOL/L — SIGNIFICANT CHANGE UP (ref 135–146)
SPECIMEN SOURCE: SIGNIFICANT CHANGE UP
WBC # BLD: 14.43 K/UL — HIGH (ref 4.8–10.8)
WBC # FLD AUTO: 14.43 K/UL — HIGH (ref 4.8–10.8)

## 2020-02-15 PROCEDURE — 99239 HOSP IP/OBS DSCHRG MGMT >30: CPT

## 2020-02-15 RX ORDER — LIDOCAINE 4 G/100G
1 CREAM TOPICAL
Qty: 7 | Refills: 0
Start: 2020-02-15 | End: 2020-02-21

## 2020-02-15 RX ORDER — METHOCARBAMOL 500 MG/1
1 TABLET, FILM COATED ORAL
Qty: 0 | Refills: 0 | DISCHARGE
Start: 2020-02-15

## 2020-02-15 RX ORDER — GABAPENTIN 400 MG/1
1 CAPSULE ORAL
Qty: 90 | Refills: 0
Start: 2020-02-15 | End: 2020-03-15

## 2020-02-15 RX ORDER — METHOCARBAMOL 500 MG/1
1 TABLET, FILM COATED ORAL
Qty: 18 | Refills: 0
Start: 2020-02-15 | End: 2020-02-20

## 2020-02-15 RX ADMIN — AMPICILLIN SODIUM AND SULBACTAM SODIUM 100 GRAM(S): 250; 125 INJECTION, POWDER, FOR SUSPENSION INTRAMUSCULAR; INTRAVENOUS at 11:03

## 2020-02-15 RX ADMIN — Medication 20 MILLIGRAM(S): at 11:03

## 2020-02-15 RX ADMIN — AMPICILLIN SODIUM AND SULBACTAM SODIUM 100 GRAM(S): 250; 125 INJECTION, POWDER, FOR SUSPENSION INTRAMUSCULAR; INTRAVENOUS at 05:34

## 2020-02-15 RX ADMIN — METHOCARBAMOL 500 MILLIGRAM(S): 500 TABLET, FILM COATED ORAL at 05:34

## 2020-02-15 RX ADMIN — GABAPENTIN 300 MILLIGRAM(S): 400 CAPSULE ORAL at 05:35

## 2020-02-15 RX ADMIN — LIDOCAINE 1 PATCH: 4 CREAM TOPICAL at 11:03

## 2020-02-15 NOTE — PROGRESS NOTE ADULT - SUBJECTIVE AND OBJECTIVE BOX
<<<RESIDENT DISCHARGE NOTE>>>     APRIL LUIS  MRN-[id number]    Vital Signs:  T(C): 36.6, Max: 36.7 (02-14 @ 20:10)  T(F): 97.8, Max: 98 (02-14 @ 20:10)  HR: 71 (71 - 88)  BP: 154/80 (109/62 - 154/80)  RR: 18 (18 - 18)  SpO2: 97% (97% - 97%)    Physical Exam:  General: Awake, Alert. Not in acute distress.  Heart: Regular rate and rhythm; S1, S2; No murmurs.  Lungs: Clear to auscultation bilaterally.  Abdomen: Soft, nontender, nondistended.  Extremities: No edema in upper or lower extremities.  Neuro: AAOx3, No focal deficits.    TEST RESULTS:  CBC (02-15 @ 06:11)                        Hgb: 13.9   WBC: 14.43 )-----------------( Plts: 253                              Hct: 40.3     Chem (02-15 @ 06:11)  Na: 140  |     Cl: 103     |  BUN: 12  -----------------------------------------< Gluc: 94    K: 3.7   |    HCO3: 23  |  Cr: 0.8    Ca 8.7 (02-15 @ 06:11)  Mg 1.9 (02-15 @ 06:11)    LFTs (02-15 @ 06:11)  TPro 6.1  /  Alb 3.5  TBili 0.4  /  DBili       /    /  AlkPhos 52    FINAL DISCHARGE INTERVIEW:    Resident Present: (Name: Gibson Razo MD)   RN Present: (Name: Zarina Mcmillan RN)    DISCHARGE MEDICATION RECONCILIATION    Reviewed with Attending (Name: Cisco Gomes MD)    DISPOSITION:     [X] Home

## 2020-02-15 NOTE — PROGRESS NOTE ADULT - SUBJECTIVE AND OBJECTIVE BOX
APRIL LUIS  39y Male    CHIEF COMPLAINT:    Patient is a 39y old  Male who presents with a chief complaint of opiate overdose (15 Feb 2020 10:30)      INTERVAL HPI/OVERNIGHT EVENTS:    Patient seen and examined. Feels good. No new compolaint    ROS: All other systems are negative.    Vital Signs:    T(F): 97.8 (02-15-20 @ 05:37), Max: 98 (20 @ 20:10)  HR: 71 (02-15-20 @ 05:37) (71 - 88)  BP: 154/80 (02-15-20 @ 05:37) (109/62 - 154/80)  RR: 18 (02-15-20 @ 05:37) (18 - 18)  SpO2: 97% (20 @ 19:54) (97% - 97%)  I&O's Summary    2020 07:01  -  15 Feb 2020 07:00  --------------------------------------------------------  IN: 3690 mL / OUT: 3100 mL / NET: 590 mL      Daily     Daily Weight in k (15 Feb 2020 05:37)  CAPILLARY BLOOD GLUCOSE          PHYSICAL EXAM:    GENERAL:  NAD  SKIN: No rashes or lesions  HENT: Atraumatic Normocephalic. PERRL. Moist membranes.  NECK: Supple, No JVD. No lymphadenopathy.  PULMONARY: CTA B/L. No wheezing. No rales  CVS: Normal S1, S2. Rate and Rhythm are regular. No murmurs.  ABDOMEN/GI: Soft, Nontender, Nondistended; BS present  EXTREMITIES: Peripheral pulses intact. No edema B/L LE.  NEUROLOGIC:  No motor or sensory deficit.  PSYCH: Alert & oriented x 3    Consultant(s) Notes Reviewed:  [x ] YES  [ ] NO  Care Discussed with Consultants/Other Providers [ x] YES  [ ] NO    EKG reviewed  Telemetry reviewed    LABS:                        13.9   14.43 )-----------( 253      ( 15 Feb 2020 06:11 )             40.3     02-15    140  |  103  |  12  ----------------------------<  94  3.7   |  23  |  0.8    Ca    8.7      15 Feb 2020 06:11  Mg     1.9     02-15    TPro  6.1  /  Alb  3.5  /  TBili  0.4  /  DBili  x   /  AST  125<H>  /  ALT  207<H>  /  AlkPhos  52  02-15        Trop --, CKMB --, CK [id number], 02-15-20 @ 06:11  Trop --, CKMB --, CK 9656, 20 @ 06:28  Trop --, [place] [address], 20 @ 05:48  Trop --, [place] [address], 20 @ 20:40  Trop --, [place] --[place] [id number], 20 @ 11:52        RADIOLOGY & ADDITIONAL TESTS:      Imaging or report Personally Reviewed:  [ ] YES  [ ] NO    Medications:  Standing  ampicillin/sulbactam  IVPB 1.5 Gram(s) IV Intermittent every 6 hours  chlorhexidine 4% Liquid 1 Application(s) Topical <User Schedule>  enoxaparin Injectable 40 milliGRAM(s) SubCutaneous daily  gabapentin 300 milliGRAM(s) Oral three times a day  lidocaine   Patch 1 Patch Transdermal daily  methocarbamol 500 milliGRAM(s) Oral three times a day  sodium chloride 0.9%. 1000 milliLiter(s) IV Continuous <Continuous>    PRN Meds      Case discussed with resident    Care discussed with pt/family

## 2020-02-15 NOTE — PROGRESS NOTE ADULT - ASSESSMENT
39 year old man with PMHx opiod abuse ( was in patient detox, last 4 weeks op detox), presenting after being found in his car unresponsive was in car for 4 to 6 h. He snorts heroin daily. He was given narcan 6mg by EMS upon arrival. He then became tachycardia to the 150s and was found to be in Afib with RVR.      1.	Heroin overdose  2.	Acute Hypercapnic respiratory failure  3.	PAF  4.	Rhabdomyolysis  5.	BENNY - resolved.   6.	Aspiration Pneumonitis  7.	Ac. Hypercapnic respiratory failure on admission  8.	Acidosis on admission  9.	Obesity / Likely SPARKLE         PLAN:    ·	CK trending down. Will D/C IVF  ·	Pt is advised to increase oral intake of fluids.   ·	No more opioid withdrawal symptoms.   ·	Needs sleep study as an out pt   ·	Blood cxs x 2 are negative  ·	D/C Abx  ·	Cont his other home meds.       * Med rec reviewed. Plan of care D/W the pt. Time spent 33 minutes.

## 2020-02-15 NOTE — PROGRESS NOTE ADULT - REASON FOR ADMISSION
opiate overdose

## 2020-02-16 LAB
CULTURE RESULTS: SIGNIFICANT CHANGE UP
CULTURE RESULTS: SIGNIFICANT CHANGE UP
SPECIMEN SOURCE: SIGNIFICANT CHANGE UP
SPECIMEN SOURCE: SIGNIFICANT CHANGE UP

## 2020-02-20 DIAGNOSIS — N17.9 ACUTE KIDNEY FAILURE, UNSPECIFIED: ICD-10-CM
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
## 2020-02-20 DIAGNOSIS — R00.0 TACHYCARDIA, UNSPECIFIED: ICD-10-CM

## 2020-02-20 DIAGNOSIS — G47.33 OBSTRUCTIVE SLEEP APNEA (ADULT) (PEDIATRIC): ICD-10-CM

## 2020-02-20 DIAGNOSIS — M62.82 RHABDOMYOLYSIS: ICD-10-CM
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
## 2020-02-20 DIAGNOSIS — J96.02 ACUTE RESPIRATORY FAILURE WITH HYPERCAPNIA: ICD-10-CM

## 2020-02-20 DIAGNOSIS — E83.52 HYPERCALCEMIA: ICD-10-CM

## 2020-02-20 DIAGNOSIS — T40.1X1A POISONING BY HEROIN, ACCIDENTAL (UNINTENTIONAL), INITIAL ENCOUNTER: ICD-10-CM

## 2020-02-20 DIAGNOSIS — E87.2 ACIDOSIS: ICD-10-CM

## 2020-02-20 DIAGNOSIS — F10.10 ALCOHOL ABUSE, UNCOMPLICATED: ICD-10-CM

## 2020-02-20 DIAGNOSIS — F17.210 NICOTINE DEPENDENCE, CIGARETTES, UNCOMPLICATED: ICD-10-CM
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
## 2020-02-20 DIAGNOSIS — M54.5 LOW BACK PAIN: ICD-10-CM

## 2020-02-20 DIAGNOSIS — J69.0 PNEUMONITIS DUE TO INHALATION OF FOOD AND VOMIT: ICD-10-CM
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
## 2020-02-20 DIAGNOSIS — M60.88 OTHER MYOSITIS, OTHER SITE: ICD-10-CM

## 2020-02-20 DIAGNOSIS — I48.0 PAROXYSMAL ATRIAL FIBRILLATION: ICD-10-CM

## 2020-03-02 ENCOUNTER — EMERGENCY (EMERGENCY)
Facility: HOSPITAL | Age: 40
LOS: 0 days | Discharge: HOME | End: 2020-03-02
Attending: EMERGENCY MEDICINE | Admitting: EMERGENCY MEDICINE
Payer: COMMERCIAL

## 2020-03-02 VITALS
SYSTOLIC BLOOD PRESSURE: 140 MMHG | OXYGEN SATURATION: 98 % | HEART RATE: 98 BPM | DIASTOLIC BLOOD PRESSURE: 70 MMHG | RESPIRATION RATE: 18 BRPM | TEMPERATURE: 99 F

## 2020-03-02 VITALS
RESPIRATION RATE: 17 BRPM | SYSTOLIC BLOOD PRESSURE: 141 MMHG | TEMPERATURE: 98 F | OXYGEN SATURATION: 97 % | HEART RATE: 111 BPM | DIASTOLIC BLOOD PRESSURE: 73 MMHG
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
## 2020-03-02 DIAGNOSIS — S51.811A LACERATION WITHOUT FOREIGN BODY OF RIGHT FOREARM, INITIAL ENCOUNTER: ICD-10-CM

## 2020-03-02 DIAGNOSIS — F17.200 NICOTINE DEPENDENCE, UNSPECIFIED, UNCOMPLICATED: ICD-10-CM
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
## 2020-03-02 DIAGNOSIS — Z23 ENCOUNTER FOR IMMUNIZATION: ICD-10-CM

## 2020-03-02 DIAGNOSIS — S51.851A OPEN BITE OF RIGHT FOREARM, INITIAL ENCOUNTER: ICD-10-CM

## 2020-03-02 PROCEDURE — 12002 RPR S/N/AX/GEN/TRNK2.6-7.5CM: CPT

## 2020-03-02 PROCEDURE — 73090 X-RAY EXAM OF FOREARM: CPT | Mod: 26,RT

## 2020-03-02 PROCEDURE — 99284 EMERGENCY DEPT VISIT MOD MDM: CPT | Mod: 25

## 2020-03-02 RX ORDER — TETANUS TOXOID, REDUCED DIPHTHERIA TOXOID AND ACELLULAR PERTUSSIS VACCINE, ADSORBED 5; 2.5; 8; 8; 2.5 [IU]/.5ML; [IU]/.5ML; UG/.5ML; UG/.5ML; UG/.5ML
0.5 SUSPENSION INTRAMUSCULAR ONCE
Refills: 0 | Status: COMPLETED | OUTPATIENT
Start: 2020-03-02 | End: 2020-03-02

## 2020-03-02 RX ADMIN — TETANUS TOXOID, REDUCED DIPHTHERIA TOXOID AND ACELLULAR PERTUSSIS VACCINE, ADSORBED 0.5 MILLILITER(S): 5; 2.5; 8; 8; 2.5 SUSPENSION INTRAMUSCULAR at 17:42

## 2020-03-02 NOTE — ED PROVIDER NOTE - NS ED ROS FT
Eyes:  No visual changes, eye pain or discharge.  ENMT:  No hearing changes, pain, no sore throat or runny nose, no difficulty swallowing  Cardiac:  No chest pain, SOB or edema. No chest pain with exertion.  Respiratory:  No cough or respiratory distress. No hemoptysis. No history of asthma or RAD.  GI:  No nausea, vomiting, diarrhea or abdominal pain.  :  No dysuria, frequency or burning.  MS:  No myalgia, muscle weakness, joint pain or back pain.  Neuro:  No headache or weakness.  No LOC.  Skin:  No skin rash. + lacerations  Endocrine: No history of thyroid disease or diabetes.

## 2020-03-02 NOTE — ED ADULT TRIAGE NOTE - CHIEF COMPLAINT QUOTE
Dog bite pt was walking his dog when another dog ran up and bit his right arm, pt states owner came got his dog and left. Pt unsure if dog was vaccinated.

## 2020-03-02 NOTE — ED PROVIDER NOTE - NSFOLLOWUPINSTRUCTIONS_ED_ALL_ED_FT
Animal Bite    Animal bites can range from mild to serious. An animal bite can result in a scratch on the skin, a deep open cut, a puncture of the skin, a crush injury, or tearing away of the skin or a body part. Treatment includes wound care, updating your tetanus shot, and possibly administering a rabies vaccine. If you were prescribed an antibiotic, take or apply it as told by your health care provider. Do not stop using the antibiotic even if your condition improves.      SEEK IMMEDIATE MEDICAL CARE IF YOU HAVE THE FOLLOWING SYMPTOMS: red streaking away from the wound, fluid/blood/pus coming from the wound, fever or chills, trouble moving the injured area, numbness or tingling extending beyond the wound. RETURN TO ED OR YOUR PRIMARY CARE PHYSICIAN IN 7 TO 10 DAYS FOR SUTURE REMOVAL.       Animal Bite    Animal bites can range from mild to serious. An animal bite can result in a scratch on the skin, a deep open cut, a puncture of the skin, a crush injury, or tearing away of the skin or a body part. Treatment includes wound care, updating your tetanus shot, and possibly administering a rabies vaccine. If you were prescribed an antibiotic, take or apply it as told by your health care provider. Do not stop using the antibiotic even if your condition improves.      SEEK IMMEDIATE MEDICAL CARE IF YOU HAVE THE FOLLOWING SYMPTOMS: red streaking away from the wound, fluid/blood/pus coming from the wound, fever or chills, trouble moving the injured area, numbness or tingling extending beyond the wound.

## 2020-03-02 NOTE — ED PROVIDER NOTE - OBJECTIVE STATEMENT
39y M w/ no sig PMH presents with dog bite suffered today. Was playing at park when random dog bit him on the arm. Owner quickly retrieved the dog and ran away. Has 2 open lacerations to R. forearm. No other injuries. Unknown last tetanus. Denies fever, chills, decreased arm strength, or numbness/tingling.

## 2020-03-02 NOTE — ED PROVIDER NOTE - PATIENT PORTAL LINK FT
You can access the FollowMyHealth Patient Portal offered by Health system by registering at the following website: [email or web address]. By joining [employer]’s FollowMyHealth portal, you will also be able to view your health information using other applications (apps) compatible with our system.

## 2020-03-02 NOTE — ED ADULT NURSE NOTE - NSIMPLEMENTINTERV_GEN_ALL_ED
Implemented All Universal Safety Interventions:  [place] to call system. Call bell, personal items and telephone within reach. Instruct patient to call for assistance. Room bathroom lighting operational. Non-slip footwear when patient is off stretcher. Physically safe environment: no spills, clutter or unnecessary equipment. Stretcher in lowest position, wheels locked, appropriate side rails in place.

## 2020-03-02 NOTE — ED PROVIDER NOTE - CLINICAL SUMMARY MEDICAL DECISION MAKING FREE TEXT BOX
pt with dog bite no FB on xray or exam, abx prescribed, wound irrigated. wound edges very loosely approximated. concerning s/s discussed with pt. srinath home

## 2020-03-02 NOTE — ED PROVIDER NOTE - PHYSICAL EXAMINATION
CONSTITUTIONAL: Well-developed; well-nourished; in no acute distress.   SKIN: warm, dry. 2 x 1.3cm laceration to R. forearm.   HEAD: Normocephalic; atraumatic.  CARD: 2+ pedal pulses B/L. Good cap refill.   EXT: Normal ROM.  No clubbing, cyanosis or edema.   LYMPH: No acute cervical adenopathy.  NEURO: Alert, oriented, grossly unremarkable  PSYCH: Cooperative, appropriate.

## 2020-03-02 NOTE — ED PROVIDER NOTE - ATTENDING CONTRIBUTION TO CARE
39 yr old male not up to date on tetanus here for dog bite to right forearm. pt bite by dog of unknown owner. after being bit, dog owner took dog and ran away. 39 yr old male not up to date on tetanus here for dog bite to right forearm. pt bite by dog of unknown owner. after being bit, dog owner took dog and ran away. agree with above skin exam. pt has full rom of right forearm. no sensory deficit. no evidence of tendon involvement.   plan for abx, wound irrigated. wound edges very loosely approximated. concerning s/s discussed with pt. dc home

## 2020-07-15 NOTE — PATIENT PROFILE ADULT - LIVES WITH
Telemedicine Visit: Established Patient     This encounter was conducted via Zoom .   Verbal consent was obtained. Patient's identity was verified.    Subjective:   CC: Sinus pain  Trey Moreno is a 58 y.o. female presenting for evaluation and management of:    Sinusitis   This is a new problem. The current episode started 1 to 4 weeks ago. The problem has been gradually worsening since onset. The maximum temperature recorded prior to her arrival was 100.4 - 100.9 F. The fever has been present for less than 1 day. Her pain is at a severity of 5/10. The pain is moderate. Associated symptoms include congestion, coughing, headaches, sinus pressure, sneezing and a sore throat. Pertinent negatives include no chills, diaphoresis, ear pain, neck pain, shortness of breath or swollen glands. Past treatments include antibiotics. The treatment provided mild relief.         Review of Systems   Constitutional: Positive for fever. Negative for chills and diaphoresis.   HENT: Positive for congestion, sinus pressure, sinus pain, sneezing and sore throat. Negative for ear pain and hearing loss.    Eyes: Negative for pain and discharge.   Respiratory: Positive for cough. Negative for hemoptysis, shortness of breath and wheezing.    Cardiovascular: Negative for chest pain, palpitations, orthopnea and leg swelling.   Gastrointestinal: Negative for constipation, diarrhea, heartburn, nausea and vomiting.   Musculoskeletal: Negative for back pain, joint pain, myalgias and neck pain.   Skin: Negative for rash.   Neurological: Positive for headaches. Negative for dizziness, tingling and weakness.   Psychiatric/Behavioral: Negative for memory loss and suicidal ideas.   All other systems reviewed and are negative.      No Known Allergies    Current medicines (including changes today)  Current Outpatient Medications   Medication Sig Dispense Refill   • amoxicillin-clavulanate (AUGMENTIN) 875-125 MG Tab Take 1 Tab by mouth 2 times a day  for 7 days. 14 Tab 0   • PROAIR  (90 Base) MCG/ACT Aero Soln inhalation aerosol Inhale 2 Puffs by mouth every four hours as needed for Shortness of Breath. 90 g 0   • fluticasone (FLONASE) 50 MCG/ACT nasal spray Spray 1 Spray in nose every day. 16 g 0   • methylPREDNISolone (MEDROL DOSEPAK) 4 MG Tablet Therapy Pack UAD 1 Package 0     No current facility-administered medications for this visit.        There are no active problems to display for this patient.      History reviewed. No pertinent family history.    She  has no past medical history on file.  She  has no past surgical history on file.       Objective:   Patient reports fever of 100.0  Respiratory rate: 12 visualized through virtual visit.    Physical Exam:  Constitutional: Alert, no distress, well-groomed.  Skin: No rashes in visible areas.  Eye: Round. Conjunctiva clear, lids normal. No icterus.   ENMT: Lips pink without lesions, good dentition, moist mucous membranes. Phonation normal.  Maxillary anus tenderness to self palpation  Neck: No masses, no thyromegaly. Moves freely without pain.  CV: Pulse as reported by patient  Respiratory: Unlabored respiratory effort, no cough or audible wheeze  Psych: Alert and oriented x3, normal affect and mood.       Assessment and Plan:   The following treatment plan was discussed:     1. Acute bacterial sinusitis  - amoxicillin-clavulanate (AUGMENTIN) 875-125 MG Tab; Take 1 Tab by mouth 2 times a day for 7 days.  Dispense: 14 Tab; Refill: 0  - PROAIR  (90 Base) MCG/ACT Aero Soln inhalation aerosol; Inhale 2 Puffs by mouth every four hours as needed for Shortness of Breath.  Dispense: 90 g; Refill: 0    2. Fever, unspecified fever cause  - COVID/SARS CoV-2 PCR; Future  - REFERRAL TO FOLLOW-UP WITH PRIMARY CARE    Discussed with patient that we cannot rule out COVID-19 infection.  Vitals are stable at this time.  Patient is advised to self isolate and provided with self isolation precautions AVS  information.  Discussed when to return to urgent care or ER including for worsening shortness of breath.  Patient verbalized understanding and has no additional questions.      Follow-up: Return if symptoms worsen or fail to improve.          spouse

## 2020-12-02 ENCOUNTER — OUTPATIENT (OUTPATIENT)
Dept: OUTPATIENT SERVICES | Facility: HOSPITAL | Age: 40
LOS: 1 days | Discharge: HOME | End: 2020-12-02
Payer: COMMERCIAL

## 2020-12-02 DIAGNOSIS — M54.5 LOW BACK PAIN: ICD-10-CM

## 2020-12-02 PROCEDURE — 72158 MRI LUMBAR SPINE W/O & W/DYE: CPT | Mod: 26

## 2023-01-06 NOTE — ED PROCEDURE NOTE - CPROC ED INFORMED CONSENT1
This was an emergent procedure and consent was implied. Ilumya Pregnancy And Lactation Text: The risk during pregnancy and breastfeeding is uncertain with this medication.

## 2023-11-24 ENCOUNTER — EMERGENCY (EMERGENCY)
Facility: HOSPITAL | Age: 43
LOS: 0 days | Discharge: HOME | End: 2023-11-24
Attending: EMERGENCY MEDICINE
Payer: COMMERCIAL

## 2023-11-24 VITALS
OXYGEN SATURATION: 100 % | SYSTOLIC BLOOD PRESSURE: 119 MMHG | WEIGHT: 220.02 LBS | TEMPERATURE: 98 F | RESPIRATION RATE: 18 BRPM | HEART RATE: 78 BPM | DIASTOLIC BLOOD PRESSURE: 70 MMHG

## 2023-11-24 DIAGNOSIS — R10.31 RIGHT LOWER QUADRANT PAIN: ICD-10-CM

## 2023-11-24 DIAGNOSIS — R11.0 NAUSEA: ICD-10-CM

## 2023-11-24 DIAGNOSIS — Z87.891 PERSONAL HISTORY OF NICOTINE DEPENDENCE: ICD-10-CM

## 2023-11-24 LAB
ALBUMIN SERPL ELPH-MCNC: 4.6 G/DL — SIGNIFICANT CHANGE UP (ref 3.5–5.2)
ALBUMIN SERPL ELPH-MCNC: 4.6 G/DL — SIGNIFICANT CHANGE UP (ref 3.5–5.2)
ALP SERPL-CCNC: 78 U/L — SIGNIFICANT CHANGE UP (ref 30–115)
ALP SERPL-CCNC: 78 U/L — SIGNIFICANT CHANGE UP (ref 30–115)
ALT FLD-CCNC: 45 U/L — HIGH (ref 0–41)
ALT FLD-CCNC: 45 U/L — HIGH (ref 0–41)
ANION GAP SERPL CALC-SCNC: 14 MMOL/L — SIGNIFICANT CHANGE UP (ref 7–14)
ANION GAP SERPL CALC-SCNC: 14 MMOL/L — SIGNIFICANT CHANGE UP (ref 7–14)
APPEARANCE UR: CLEAR — SIGNIFICANT CHANGE UP
APPEARANCE UR: CLEAR — SIGNIFICANT CHANGE UP
AST SERPL-CCNC: 21 U/L — SIGNIFICANT CHANGE UP (ref 0–41)
AST SERPL-CCNC: 21 U/L — SIGNIFICANT CHANGE UP (ref 0–41)
BASOPHILS # BLD AUTO: 0.12 K/UL — SIGNIFICANT CHANGE UP (ref 0–0.2)
BASOPHILS # BLD AUTO: 0.12 K/UL — SIGNIFICANT CHANGE UP (ref 0–0.2)
BASOPHILS NFR BLD AUTO: 0.9 % — SIGNIFICANT CHANGE UP (ref 0–1)
BASOPHILS NFR BLD AUTO: 0.9 % — SIGNIFICANT CHANGE UP (ref 0–1)
BILIRUB SERPL-MCNC: 0.2 MG/DL — SIGNIFICANT CHANGE UP (ref 0.2–1.2)
BILIRUB SERPL-MCNC: 0.2 MG/DL — SIGNIFICANT CHANGE UP (ref 0.2–1.2)
BILIRUB UR-MCNC: NEGATIVE — SIGNIFICANT CHANGE UP
BILIRUB UR-MCNC: NEGATIVE — SIGNIFICANT CHANGE UP
BUN SERPL-MCNC: 17 MG/DL — SIGNIFICANT CHANGE UP (ref 10–20)
BUN SERPL-MCNC: 17 MG/DL — SIGNIFICANT CHANGE UP (ref 10–20)
CALCIUM SERPL-MCNC: 9.6 MG/DL — SIGNIFICANT CHANGE UP (ref 8.4–10.5)
CALCIUM SERPL-MCNC: 9.6 MG/DL — SIGNIFICANT CHANGE UP (ref 8.4–10.5)
CHLORIDE SERPL-SCNC: 105 MMOL/L — SIGNIFICANT CHANGE UP (ref 98–110)
CHLORIDE SERPL-SCNC: 105 MMOL/L — SIGNIFICANT CHANGE UP (ref 98–110)
CO2 SERPL-SCNC: 25 MMOL/L — SIGNIFICANT CHANGE UP (ref 17–32)
CO2 SERPL-SCNC: 25 MMOL/L — SIGNIFICANT CHANGE UP (ref 17–32)
COLOR SPEC: YELLOW — SIGNIFICANT CHANGE UP
COLOR SPEC: YELLOW — SIGNIFICANT CHANGE UP
CREAT SERPL-MCNC: 1 MG/DL — SIGNIFICANT CHANGE UP (ref 0.7–1.5)
CREAT SERPL-MCNC: 1 MG/DL — SIGNIFICANT CHANGE UP (ref 0.7–1.5)
DIFF PNL FLD: NEGATIVE — SIGNIFICANT CHANGE UP
DIFF PNL FLD: NEGATIVE — SIGNIFICANT CHANGE UP
EGFR: 96 ML/MIN/1.73M2 — SIGNIFICANT CHANGE UP
EGFR: 96 ML/MIN/1.73M2 — SIGNIFICANT CHANGE UP
EOSINOPHIL # BLD AUTO: 0.27 K/UL — SIGNIFICANT CHANGE UP (ref 0–0.7)
EOSINOPHIL # BLD AUTO: 0.27 K/UL — SIGNIFICANT CHANGE UP (ref 0–0.7)
EOSINOPHIL NFR BLD AUTO: 2.1 % — SIGNIFICANT CHANGE UP (ref 0–8)
EOSINOPHIL NFR BLD AUTO: 2.1 % — SIGNIFICANT CHANGE UP (ref 0–8)
GLUCOSE SERPL-MCNC: 88 MG/DL — SIGNIFICANT CHANGE UP (ref 70–99)
GLUCOSE SERPL-MCNC: 88 MG/DL — SIGNIFICANT CHANGE UP (ref 70–99)
GLUCOSE UR QL: NEGATIVE MG/DL — SIGNIFICANT CHANGE UP
GLUCOSE UR QL: NEGATIVE MG/DL — SIGNIFICANT CHANGE UP
HCT VFR BLD CALC: 45.2 % — SIGNIFICANT CHANGE UP (ref 42–52)
HCT VFR BLD CALC: 45.2 % — SIGNIFICANT CHANGE UP (ref 42–52)
HGB BLD-MCNC: 15.4 G/DL — SIGNIFICANT CHANGE UP (ref 14–18)
HGB BLD-MCNC: 15.4 G/DL — SIGNIFICANT CHANGE UP (ref 14–18)
IMM GRANULOCYTES NFR BLD AUTO: 0.8 % — HIGH (ref 0.1–0.3)
IMM GRANULOCYTES NFR BLD AUTO: 0.8 % — HIGH (ref 0.1–0.3)
KETONES UR-MCNC: NEGATIVE MG/DL — SIGNIFICANT CHANGE UP
KETONES UR-MCNC: NEGATIVE MG/DL — SIGNIFICANT CHANGE UP
LACTATE SERPL-SCNC: 0.6 MMOL/L — LOW (ref 0.7–2)
LACTATE SERPL-SCNC: 0.6 MMOL/L — LOW (ref 0.7–2)
LEUKOCYTE ESTERASE UR-ACNC: NEGATIVE — SIGNIFICANT CHANGE UP
LEUKOCYTE ESTERASE UR-ACNC: NEGATIVE — SIGNIFICANT CHANGE UP
LIDOCAIN IGE QN: 16 U/L — SIGNIFICANT CHANGE UP (ref 7–60)
LIDOCAIN IGE QN: 16 U/L — SIGNIFICANT CHANGE UP (ref 7–60)
LYMPHOCYTES # BLD AUTO: 27.3 % — SIGNIFICANT CHANGE UP (ref 20.5–51.1)
LYMPHOCYTES # BLD AUTO: 27.3 % — SIGNIFICANT CHANGE UP (ref 20.5–51.1)
LYMPHOCYTES # BLD AUTO: 3.5 K/UL — HIGH (ref 1.2–3.4)
LYMPHOCYTES # BLD AUTO: 3.5 K/UL — HIGH (ref 1.2–3.4)
MCHC RBC-ENTMCNC: 31 PG — SIGNIFICANT CHANGE UP (ref 27–31)
MCHC RBC-ENTMCNC: 31 PG — SIGNIFICANT CHANGE UP (ref 27–31)
MCHC RBC-ENTMCNC: 34.1 G/DL — SIGNIFICANT CHANGE UP (ref 32–37)
MCHC RBC-ENTMCNC: 34.1 G/DL — SIGNIFICANT CHANGE UP (ref 32–37)
MCV RBC AUTO: 91.1 FL — SIGNIFICANT CHANGE UP (ref 80–94)
MCV RBC AUTO: 91.1 FL — SIGNIFICANT CHANGE UP (ref 80–94)
MONOCYTES # BLD AUTO: 1.17 K/UL — HIGH (ref 0.1–0.6)
MONOCYTES # BLD AUTO: 1.17 K/UL — HIGH (ref 0.1–0.6)
MONOCYTES NFR BLD AUTO: 9.1 % — SIGNIFICANT CHANGE UP (ref 1.7–9.3)
MONOCYTES NFR BLD AUTO: 9.1 % — SIGNIFICANT CHANGE UP (ref 1.7–9.3)
NEUTROPHILS # BLD AUTO: 7.65 K/UL — HIGH (ref 1.4–6.5)
NEUTROPHILS # BLD AUTO: 7.65 K/UL — HIGH (ref 1.4–6.5)
NEUTROPHILS NFR BLD AUTO: 59.8 % — SIGNIFICANT CHANGE UP (ref 42.2–75.2)
NEUTROPHILS NFR BLD AUTO: 59.8 % — SIGNIFICANT CHANGE UP (ref 42.2–75.2)
NITRITE UR-MCNC: NEGATIVE — SIGNIFICANT CHANGE UP
NITRITE UR-MCNC: NEGATIVE — SIGNIFICANT CHANGE UP
NRBC # BLD: 0 /100 WBCS — SIGNIFICANT CHANGE UP (ref 0–0)
NRBC # BLD: 0 /100 WBCS — SIGNIFICANT CHANGE UP (ref 0–0)
PH UR: 5.5 — SIGNIFICANT CHANGE UP (ref 5–8)
PH UR: 5.5 — SIGNIFICANT CHANGE UP (ref 5–8)
PLATELET # BLD AUTO: 236 K/UL — SIGNIFICANT CHANGE UP (ref 130–400)
PLATELET # BLD AUTO: 236 K/UL — SIGNIFICANT CHANGE UP (ref 130–400)
PMV BLD: 11.1 FL — HIGH (ref 7.4–10.4)
PMV BLD: 11.1 FL — HIGH (ref 7.4–10.4)
POTASSIUM SERPL-MCNC: 4.4 MMOL/L — SIGNIFICANT CHANGE UP (ref 3.5–5)
POTASSIUM SERPL-MCNC: 4.4 MMOL/L — SIGNIFICANT CHANGE UP (ref 3.5–5)
POTASSIUM SERPL-SCNC: 4.4 MMOL/L — SIGNIFICANT CHANGE UP (ref 3.5–5)
POTASSIUM SERPL-SCNC: 4.4 MMOL/L — SIGNIFICANT CHANGE UP (ref 3.5–5)
PROT SERPL-MCNC: 7.1 G/DL — SIGNIFICANT CHANGE UP (ref 6–8)
PROT SERPL-MCNC: 7.1 G/DL — SIGNIFICANT CHANGE UP (ref 6–8)
PROT UR-MCNC: NEGATIVE MG/DL — SIGNIFICANT CHANGE UP
PROT UR-MCNC: NEGATIVE MG/DL — SIGNIFICANT CHANGE UP
RBC # BLD: 4.96 M/UL — SIGNIFICANT CHANGE UP (ref 4.7–6.1)
RBC # BLD: 4.96 M/UL — SIGNIFICANT CHANGE UP (ref 4.7–6.1)
RBC # FLD: 11.9 % — SIGNIFICANT CHANGE UP (ref 11.5–14.5)
RBC # FLD: 11.9 % — SIGNIFICANT CHANGE UP (ref 11.5–14.5)
SODIUM SERPL-SCNC: 144 MMOL/L — SIGNIFICANT CHANGE UP (ref 135–146)
SODIUM SERPL-SCNC: 144 MMOL/L — SIGNIFICANT CHANGE UP (ref 135–146)
SP GR SPEC: 1.03 — SIGNIFICANT CHANGE UP (ref 1–1.03)
SP GR SPEC: 1.03 — SIGNIFICANT CHANGE UP (ref 1–1.03)
UROBILINOGEN FLD QL: 0.2 MG/DL — SIGNIFICANT CHANGE UP (ref 0.2–1)
UROBILINOGEN FLD QL: 0.2 MG/DL — SIGNIFICANT CHANGE UP (ref 0.2–1)
WBC # BLD: 12.81 K/UL — HIGH (ref 4.8–10.8)
WBC # BLD: 12.81 K/UL — HIGH (ref 4.8–10.8)
WBC # FLD AUTO: 12.81 K/UL — HIGH (ref 4.8–10.8)
WBC # FLD AUTO: 12.81 K/UL — HIGH (ref 4.8–10.8)

## 2023-11-24 PROCEDURE — 85025 COMPLETE CBC W/AUTO DIFF WBC: CPT

## 2023-11-24 PROCEDURE — 74177 CT ABD & PELVIS W/CONTRAST: CPT | Mod: 26,MA

## 2023-11-24 PROCEDURE — 81003 URINALYSIS AUTO W/O SCOPE: CPT

## 2023-11-24 PROCEDURE — 87086 URINE CULTURE/COLONY COUNT: CPT

## 2023-11-24 PROCEDURE — 83690 ASSAY OF LIPASE: CPT

## 2023-11-24 PROCEDURE — 80053 COMPREHEN METABOLIC PANEL: CPT

## 2023-11-24 PROCEDURE — 87186 SC STD MICRODIL/AGAR DIL: CPT

## 2023-11-24 PROCEDURE — 99284 EMERGENCY DEPT VISIT MOD MDM: CPT | Mod: 25

## 2023-11-24 PROCEDURE — 87077 CULTURE AEROBIC IDENTIFY: CPT

## 2023-11-24 PROCEDURE — 74177 CT ABD & PELVIS W/CONTRAST: CPT | Mod: MA

## 2023-11-24 PROCEDURE — 83605 ASSAY OF LACTIC ACID: CPT

## 2023-11-24 PROCEDURE — 99285 EMERGENCY DEPT VISIT HI MDM: CPT

## 2023-11-24 NOTE — ED PROVIDER NOTE - PATIENT PORTAL LINK FT
You can access the FollowMyHealth Patient Portal offered by [facility] by registering at the following website: [email or web address]. By joining [employer]’s FollowMyHealth portal, you will also be able to view your health information using other applications (apps) compatible with our system.

## 2023-11-24 NOTE — ED PROVIDER NOTE - PROGRESS NOTE DETAILS
discussed CT results with patient, discussed strict return precautions. pt agreeable for discharge and aware of symptoms promt to return to ED

## 2023-11-24 NOTE — ED PROVIDER NOTE - PHYSICAL EXAMINATION
CONST: Well appearing in NAD  CARD: Normal S1 S2; Normal rate and rhythm  RESP: Equal BS B/L, No wheezes, rhonchi or rales. No distress  GI: Soft, non-distended, TTP RLQ, pain in RLQ with palpation RUQ  MS: Normal ROM in all extremities. No midline spinal tenderness.  SKIN: Warm, dry, no acute rashes. Good turgor  NEURO: A&Ox3, No focal deficits. Strength 5/5 with no sensory deficits. Steady gait

## 2023-11-24 NOTE — ED PROVIDER NOTE - CARE PROVIDER_API CALL
follow up with your primary doctor,   Phone: (   )    -  Fax: (   )    -  Follow Up Time: 1-3 Days    Lacie Ford.  Family Medicine  [address] [place] [id number]  Phone: [phone]  Fax: [phone]  Follow Up Time: 1-3 Days

## 2023-11-24 NOTE — ED PROVIDER NOTE - NSFOLLOWUPINSTRUCTIONS_ED_ALL_ED_FT
RETURN TO ED FOR NEW OR WORSENING SYMPTOMS    Abdominal Pain, Adult  Pain in the abdomen (abdominal pain) can be caused by many things. Often, abdominal pain is not serious and it gets better with no treatment or by being treated at home. However, sometimes abdominal pain is serious.    Your health care provider will ask questions about your medical history and do a physical exam to try to determine the cause of your abdominal pain.    Follow these instructions at home:  Medicines    Take over-the-counter and prescription medicines only as told by your health care provider.  Do not take a laxative unless told by your health care provider.  General instructions      Watch your condition for any changes.  Drink enough fluid to keep your urine pale yellow.  Keep all follow-up visits as told by your health care provider. This is important.  Contact a health care provider if:  Your abdominal pain changes or gets worse.  You are not hungry or you lose weight without trying.  You are constipated or have diarrhea for more than 2–3 days.  You have pain when you urinate or have a bowel movement.  Your abdominal pain wakes you up at night.  Your pain gets worse with meals, after eating, or with certain foods.  You are vomiting and cannot keep anything down.  You have a fever.  You have blood in your urine.  Get help right away if:  Your pain does not go away as soon as your health care provider told you to expect.  You cannot stop vomiting.  Your pain is only in areas of the abdomen, such as the right side or the left lower portion of the abdomen. Pain on the right side could be caused by appendicitis.  You have bloody or black stools, or stools that look like tar.  You have severe pain, cramping, or bloating in your abdomen.  You have signs of dehydration, such as:  Dark urine, very little urine, or no urine.  Cracked lips.  Dry mouth.  Sunken eyes.  Sleepiness.  Weakness.  You have trouble breathing or chest pain.  Summary  Often, abdominal pain is not serious and it gets better with no treatment or by being treated at home. However, sometimes abdominal pain is serious.  Watch your condition for any changes.  Take over-the-counter and prescription medicines only as told by your health care provider.  Contact a health care provider if your abdominal pain changes or gets worse.  Get help right away if you have severe pain, cramping, or bloating in your abdomen.  This information is not intended to replace advice given to you by your health care provider. Make sure you discuss any questions you have with your health care provider.

## 2023-11-24 NOTE — ED PROVIDER NOTE - ATTENDING APP SHARED VISIT CONTRIBUTION OF CARE
43-year-old male no significant past medical history presents for evaluation of right-sided lower abdominal pain that started a few hours ago.  Positive nausea, no vomiting, no diarrhea, no fever or chills, no urinary complaints, no history of trauma, on exam patient in NAD, AAOx3, abdomen is soft, nondistended, positive bowel sounds, positive tenderness to right lower quadrant, no rebound or guarding

## 2023-11-24 NOTE — ED PROVIDER NOTE - OBJECTIVE STATEMENT
patient is a 44yo male no significant PMH complaining of right lower abdominal pain starting x3 hours ago. has associated nausea, no vomiting. has been constant, nonradiating. pain worse with movement, walking. denies urinary symptoms, diarrhea, constipation, vomiting, fever.

## 2023-11-24 NOTE — ED PROVIDER NOTE - PROVIDER TOKENS
FREE:[LAST:[follow up with your primary doctor],PHONE:[(   )    -],FAX:[(   )    -],FOLLOWUP:[1-3 Days]],PROVIDER:[TOKEN:[[id number]],FOLLOWUP:[1-3 Days]]

## 2023-11-24 NOTE — ED PROVIDER NOTE - CLINICAL SUMMARY MEDICAL DECISION MAKING FREE TEXT BOX
Labs and imaging were obtained.  Results reviewed.  CT with normal appendix.  Results discussed with patient and family member.  Copies given.  Patient needs to follow-up with GI and PMD.  Strict return precautions discussed. Pt instructed to return if any worsening symptoms or concerns.  They verbalize understanding.

## 2023-11-26 RX ORDER — CEFPODOXIME PROXETIL 100 MG
1 TABLET ORAL
Qty: 14 | Refills: 0
Start: 2023-11-26 | End: 2023-12-02

## 2023-11-27 LAB
-  AMPICILLIN: SIGNIFICANT CHANGE UP
-  AMPICILLIN: SIGNIFICANT CHANGE UP
-  CIPROFLOXACIN: SIGNIFICANT CHANGE UP
-  CIPROFLOXACIN: SIGNIFICANT CHANGE UP
-  LEVOFLOXACIN: SIGNIFICANT CHANGE UP
-  LEVOFLOXACIN: SIGNIFICANT CHANGE UP
-  NITROFURANTOIN: SIGNIFICANT CHANGE UP
-  NITROFURANTOIN: SIGNIFICANT CHANGE UP
-  TETRACYCLINE: SIGNIFICANT CHANGE UP
-  TETRACYCLINE: SIGNIFICANT CHANGE UP
-  VANCOMYCIN: SIGNIFICANT CHANGE UP
-  VANCOMYCIN: SIGNIFICANT CHANGE UP
CULTURE RESULTS: ABNORMAL
CULTURE RESULTS: ABNORMAL
METHOD TYPE: SIGNIFICANT CHANGE UP
METHOD TYPE: SIGNIFICANT CHANGE UP
ORGANISM # SPEC MICROSCOPIC CNT: ABNORMAL
ORGANISM # SPEC MICROSCOPIC CNT: ABNORMAL
ORGANISM # SPEC MICROSCOPIC CNT: SIGNIFICANT CHANGE UP
ORGANISM # SPEC MICROSCOPIC CNT: SIGNIFICANT CHANGE UP
SPECIMEN SOURCE: SIGNIFICANT CHANGE UP
SPECIMEN SOURCE: SIGNIFICANT CHANGE UP

## 2023-12-14 NOTE — SBIRT NOTE ADULT - NSSBIRTDRGWITHDRSX_GEN_A_CORE
The patient's goals for the shift include rest and comfort     The clinical goals for the shift include: remain safe during shift      Yes

## 2024-01-20 NOTE — PATIENT PROFILE ADULT - VISION (WITH CORRECTIVE LENSES IF THE PATIENT USUALLY WEARS THEM):
Normal vision: sees adequately in most situations; can see medication labels, newsprint
(E4) spontaneous

## 2024-12-01 NOTE — ED PROCEDURE NOTE - CPROC ED COMPLICATIONS1
Steve Raad    OPERATIVE NOTE    Preoperative Diagnosis: Cataract right eye    Postoperative Diagnosis: Cataract right eye    Procedure: Phacoemulsification with intraocular lens inplantation, right eye  Surgeon: Leora Medina MD    Anesthesia: MAC, topical.    Complications: none    Estimated blood loss: minimal    Specimens: none    Indications for procedure: The patient is a 77y.o. year old with decreased vision, glare and halos around lights, and trouble with activities of daily living. Examination revealed a visually significant cataract in the right eye. Risks, benefits, and alternatives to surgery were discussed with the patient and the patient elected to proceed with phacoemulsification with lens implantation. Details of the procedure: Following informed consent, the patient was taken to the operating room and placed in the supine position. Monitored anesthesia care was administered. The eye was prepped and draped in the usual sterile fashion using aseptic technique for cataract surgery. A side port incision was made. 1% preservative free lidocaine was injected through the side port incision for topical anesthesia. The eye was filled with viscoelastic and a 2.4 mm keratome blade was used to make a 3-plane clear corneal incision in the temporal cornea. The cystitome was used to make a tear in the anterior capsule and a Utrata forceps was used to make a complete curvilinear capsulorrhexis. The lens was hydrodissected and freely rotated. Phacoemulsification was performed. Irrigation/aspiration was used to remove all cortical material from the capsular bag. The eye was filled with viscoelastic and a foldable posterior chamber intraocular lens was injected into the capsular bag. The lens was rotated to the appropriate axis as needed. Irrigation/aspiration was used to remove all excess viscoelastic. The eye was pressurized with BSS and the wounds were check for leaks and none were found. The patient had betadine and Alphagan solutions placed on the eye. The patient went to the PACU in excellent condition, having tolerated the procedure well. no 14